# Patient Record
Sex: FEMALE | Race: ASIAN | NOT HISPANIC OR LATINO | Employment: OTHER | URBAN - METROPOLITAN AREA
[De-identification: names, ages, dates, MRNs, and addresses within clinical notes are randomized per-mention and may not be internally consistent; named-entity substitution may affect disease eponyms.]

---

## 2019-04-17 ENCOUNTER — HOSPITAL ENCOUNTER (OUTPATIENT)
Facility: CLINIC | Age: 84
End: 2019-04-17
Attending: INTERNAL MEDICINE | Admitting: INTERNAL MEDICINE
Payer: COMMERCIAL

## 2024-01-01 ENCOUNTER — HOSPITAL ENCOUNTER (OUTPATIENT)
Facility: CLINIC | Age: 89
Setting detail: OBSERVATION
Discharge: HOME | End: 2024-07-22
Attending: EMERGENCY MEDICINE | Admitting: RADIOLOGY
Payer: COMMERCIAL

## 2024-01-01 ENCOUNTER — DOCUMENTATION ONLY (OUTPATIENT)
Dept: OTHER | Facility: CLINIC | Age: 89
End: 2024-01-01
Payer: COMMERCIAL

## 2024-01-01 ENCOUNTER — APPOINTMENT (OUTPATIENT)
Dept: ULTRASOUND IMAGING | Facility: CLINIC | Age: 89
End: 2024-01-01
Attending: INTERNAL MEDICINE
Payer: COMMERCIAL

## 2024-01-01 ENCOUNTER — HOSPITAL ENCOUNTER (OUTPATIENT)
Facility: CLINIC | Age: 89
Setting detail: OBSERVATION
Discharge: HOME | End: 2024-08-05
Attending: EMERGENCY MEDICINE | Admitting: RADIOLOGY
Payer: COMMERCIAL

## 2024-01-01 ENCOUNTER — APPOINTMENT (OUTPATIENT)
Dept: CT IMAGING | Facility: CLINIC | Age: 89
End: 2024-01-01
Attending: EMERGENCY MEDICINE
Payer: COMMERCIAL

## 2024-01-01 ENCOUNTER — DOCUMENTATION ONLY (OUTPATIENT)
Dept: OTHER | Facility: CLINIC | Age: 89
End: 2024-01-01

## 2024-01-01 ENCOUNTER — PATIENT OUTREACH (OUTPATIENT)
Dept: CARE COORDINATION | Facility: CLINIC | Age: 89
End: 2024-01-01
Payer: COMMERCIAL

## 2024-01-01 ENCOUNTER — APPOINTMENT (OUTPATIENT)
Dept: CT IMAGING | Facility: CLINIC | Age: 89
End: 2024-01-01
Payer: COMMERCIAL

## 2024-01-01 VITALS
BODY MASS INDEX: 23.87 KG/M2 | SYSTOLIC BLOOD PRESSURE: 171 MMHG | OXYGEN SATURATION: 94 % | DIASTOLIC BLOOD PRESSURE: 70 MMHG | HEART RATE: 73 BPM | RESPIRATION RATE: 18 BRPM | HEIGHT: 63 IN | TEMPERATURE: 97.7 F | WEIGHT: 134.7 LBS

## 2024-01-01 VITALS
BODY MASS INDEX: 24.25 KG/M2 | HEART RATE: 74 BPM | SYSTOLIC BLOOD PRESSURE: 149 MMHG | DIASTOLIC BLOOD PRESSURE: 52 MMHG | RESPIRATION RATE: 20 BRPM | OXYGEN SATURATION: 92 % | WEIGHT: 136.91 LBS | TEMPERATURE: 97.8 F

## 2024-01-01 DIAGNOSIS — K74.60 CIRRHOSIS OF LIVER WITH ASCITES, UNSPECIFIED HEPATIC CIRRHOSIS TYPE (H): Primary | ICD-10-CM

## 2024-01-01 DIAGNOSIS — I81 PORTAL VEIN THROMBOSIS: ICD-10-CM

## 2024-01-01 DIAGNOSIS — J90 BILATERAL PLEURAL EFFUSION: ICD-10-CM

## 2024-01-01 DIAGNOSIS — R18.8 OTHER ASCITES: Primary | ICD-10-CM

## 2024-01-01 DIAGNOSIS — J96.01 ACUTE RESPIRATORY FAILURE WITH HYPOXIA (H): ICD-10-CM

## 2024-01-01 DIAGNOSIS — C22.0 HCC (HEPATOCELLULAR CARCINOMA) (H): ICD-10-CM

## 2024-01-01 DIAGNOSIS — C78.00 MALIGNANT NEOPLASM METASTATIC TO LUNG, UNSPECIFIED LATERALITY (H): ICD-10-CM

## 2024-01-01 DIAGNOSIS — K74.60 CIRRHOSIS OF LIVER WITH ASCITES, UNSPECIFIED HEPATIC CIRRHOSIS TYPE (H): ICD-10-CM

## 2024-01-01 DIAGNOSIS — R18.8 CIRRHOSIS OF LIVER WITH ASCITES, UNSPECIFIED HEPATIC CIRRHOSIS TYPE (H): ICD-10-CM

## 2024-01-01 DIAGNOSIS — R18.0 MALIGNANT ASCITES (H): ICD-10-CM

## 2024-01-01 DIAGNOSIS — R18.8 CIRRHOSIS OF LIVER WITH ASCITES, UNSPECIFIED HEPATIC CIRRHOSIS TYPE (H): Primary | ICD-10-CM

## 2024-01-01 DIAGNOSIS — S30.1XXA HEMATOMA OF LEFT FLANK, INITIAL ENCOUNTER: ICD-10-CM

## 2024-01-01 DIAGNOSIS — G25.81 RESTLESS LEGS SYNDROME: ICD-10-CM

## 2024-01-01 LAB
% LINING CELLS, BODY FLUID: 7 %
ABSOLUTE NEUTROPHILS, BODY FLUID: 0 /UL
ALBUMIN BODY FLUID SOURCE: NORMAL
ALBUMIN FLD-MCNC: 0.6 G/DL
ALBUMIN SERPL BCG-MCNC: 3.4 G/DL (ref 3.5–5.2)
ALBUMIN SERPL BCG-MCNC: 3.6 G/DL (ref 3.5–5.2)
ALBUMIN SERPL BCG-MCNC: 3.9 G/DL (ref 3.5–5.2)
ALP SERPL-CCNC: 168 U/L (ref 40–150)
ALP SERPL-CCNC: 203 U/L (ref 40–150)
ALT SERPL W P-5'-P-CCNC: 58 U/L (ref 0–50)
ALT SERPL W P-5'-P-CCNC: 82 U/L (ref 0–50)
ANION GAP SERPL CALCULATED.3IONS-SCNC: 13 MMOL/L (ref 7–15)
ANION GAP SERPL CALCULATED.3IONS-SCNC: 14 MMOL/L (ref 7–15)
APPEARANCE FLD: ABNORMAL
APTT PPP: 32 SECONDS (ref 22–38)
AST SERPL W P-5'-P-CCNC: 164 U/L (ref 0–45)
AST SERPL W P-5'-P-CCNC: 264 U/L (ref 0–45)
ATRIAL RATE - MUSE: 67 BPM
BACTERIA FLD CULT: NO GROWTH
BASOPHILS # BLD AUTO: 0 10E3/UL (ref 0–0.2)
BASOPHILS # BLD AUTO: 0 10E3/UL (ref 0–0.2)
BASOPHILS NFR BLD AUTO: 0 %
BASOPHILS NFR BLD AUTO: 0 %
BILIRUB SERPL-MCNC: 1.6 MG/DL
BILIRUB SERPL-MCNC: 3.6 MG/DL
BUN SERPL-MCNC: 11.1 MG/DL (ref 8–23)
BUN SERPL-MCNC: 13.1 MG/DL (ref 8–23)
BUN SERPL-MCNC: 30.2 MG/DL (ref 8–23)
BUN SERPL-MCNC: 32 MG/DL (ref 8–23)
BUN SERPL-MCNC: 32.4 MG/DL (ref 8–23)
CALCIUM SERPL-MCNC: 9 MG/DL (ref 8.8–10.4)
CALCIUM SERPL-MCNC: 9.4 MG/DL (ref 8.8–10.4)
CALCIUM SERPL-MCNC: 9.6 MG/DL (ref 8.8–10.4)
CALCIUM SERPL-MCNC: 9.6 MG/DL (ref 8.8–10.4)
CALCIUM SERPL-MCNC: 9.8 MG/DL (ref 8.8–10.4)
CELL COUNT BODY FLUID SOURCE: ABNORMAL
CHLORIDE SERPL-SCNC: 102 MMOL/L (ref 98–107)
CHLORIDE SERPL-SCNC: 95 MMOL/L (ref 98–107)
CHLORIDE SERPL-SCNC: 95 MMOL/L (ref 98–107)
CHLORIDE SERPL-SCNC: 96 MMOL/L (ref 98–107)
CHLORIDE SERPL-SCNC: 98 MMOL/L (ref 98–107)
COLOR FLD: YELLOW
CREAT SERPL-MCNC: 0.67 MG/DL (ref 0.51–0.95)
CREAT SERPL-MCNC: 0.75 MG/DL (ref 0.51–0.95)
CREAT SERPL-MCNC: 0.85 MG/DL (ref 0.51–0.95)
CREAT SERPL-MCNC: 0.95 MG/DL (ref 0.51–0.95)
CREAT SERPL-MCNC: 1 MG/DL (ref 0.51–0.95)
D DIMER PPP FEU-MCNC: 3.76 UG/ML FEU (ref 0–0.5)
DIASTOLIC BLOOD PRESSURE - MUSE: NORMAL MMHG
EGFRCR SERPLBLD CKD-EPI 2021: 51 ML/MIN/1.73M2
EGFRCR SERPLBLD CKD-EPI 2021: 54 ML/MIN/1.73M2
EGFRCR SERPLBLD CKD-EPI 2021: 62 ML/MIN/1.73M2
EGFRCR SERPLBLD CKD-EPI 2021: 72 ML/MIN/1.73M2
EGFRCR SERPLBLD CKD-EPI 2021: 79 ML/MIN/1.73M2
EOSINOPHIL # BLD AUTO: 0 10E3/UL (ref 0–0.7)
EOSINOPHIL # BLD AUTO: 0.1 10E3/UL (ref 0–0.7)
EOSINOPHIL NFR BLD AUTO: 0 %
EOSINOPHIL NFR BLD AUTO: 1 %
ERYTHROCYTE [DISTWIDTH] IN BLOOD BY AUTOMATED COUNT: 14.2 % (ref 10–15)
ERYTHROCYTE [DISTWIDTH] IN BLOOD BY AUTOMATED COUNT: 15.9 % (ref 10–15)
ERYTHROCYTE [DISTWIDTH] IN BLOOD BY AUTOMATED COUNT: 16.1 % (ref 10–15)
FERRITIN SERPL-MCNC: 522 NG/ML (ref 11–328)
FLUAV RNA SPEC QL NAA+PROBE: NEGATIVE
FLUBV RNA RESP QL NAA+PROBE: NEGATIVE
GLUCOSE SERPL-MCNC: 108 MG/DL (ref 70–99)
GLUCOSE SERPL-MCNC: 109 MG/DL (ref 70–99)
GLUCOSE SERPL-MCNC: 112 MG/DL (ref 70–99)
GLUCOSE SERPL-MCNC: 124 MG/DL (ref 70–99)
GLUCOSE SERPL-MCNC: 96 MG/DL (ref 70–99)
GRAM STAIN RESULT: NORMAL
GRAM STAIN RESULT: NORMAL
HCO3 SERPL-SCNC: 19 MMOL/L (ref 22–29)
HCO3 SERPL-SCNC: 19 MMOL/L (ref 22–29)
HCO3 SERPL-SCNC: 20 MMOL/L (ref 22–29)
HCO3 SERPL-SCNC: 21 MMOL/L (ref 22–29)
HCO3 SERPL-SCNC: 22 MMOL/L (ref 22–29)
HCT VFR BLD AUTO: 34.3 % (ref 35–47)
HCT VFR BLD AUTO: 36.1 % (ref 35–47)
HCT VFR BLD AUTO: 37.1 % (ref 35–47)
HGB BLD-MCNC: 11.1 G/DL (ref 11.7–15.7)
HGB BLD-MCNC: 11.5 G/DL (ref 11.7–15.7)
HGB BLD-MCNC: 11.9 G/DL (ref 11.7–15.7)
IMM GRANULOCYTES # BLD: 0 10E3/UL
IMM GRANULOCYTES # BLD: 0.2 10E3/UL
IMM GRANULOCYTES NFR BLD: 1 %
IMM GRANULOCYTES NFR BLD: 2 %
INR PPP: 1.21 (ref 0.85–1.15)
INTERPRETATION ECG - MUSE: NORMAL
IRON BINDING CAPACITY (ROCHE): 204 UG/DL (ref 240–430)
IRON SATN MFR SERPL: 24 % (ref 15–46)
IRON SERPL-MCNC: 49 UG/DL (ref 37–145)
LD BODY BODY FLUID SOURCE: NORMAL
LDH FLD L TO P-CCNC: 35 U/L
LDH SERPL L TO P-CCNC: 253 U/L (ref 0–250)
LIPASE SERPL-CCNC: 32 U/L (ref 13–60)
LYMPHOCYTES # BLD AUTO: 0.8 10E3/UL (ref 0.8–5.3)
LYMPHOCYTES # BLD AUTO: 1.1 10E3/UL (ref 0.8–5.3)
LYMPHOCYTES NFR BLD AUTO: 18 %
LYMPHOCYTES NFR BLD AUTO: 8 %
LYMPHOCYTES NFR FLD MANUAL: 41 %
MCH RBC QN AUTO: 30 PG (ref 26.5–33)
MCH RBC QN AUTO: 30.3 PG (ref 26.5–33)
MCH RBC QN AUTO: 30.7 PG (ref 26.5–33)
MCHC RBC AUTO-ENTMCNC: 31.9 G/DL (ref 31.5–36.5)
MCHC RBC AUTO-ENTMCNC: 32.1 G/DL (ref 31.5–36.5)
MCHC RBC AUTO-ENTMCNC: 32.4 G/DL (ref 31.5–36.5)
MCV RBC AUTO: 94 FL (ref 78–100)
MCV RBC AUTO: 94 FL (ref 78–100)
MCV RBC AUTO: 95 FL (ref 78–100)
MONOCYTES # BLD AUTO: 0.8 10E3/UL (ref 0–1.3)
MONOCYTES # BLD AUTO: 1.4 10E3/UL (ref 0–1.3)
MONOCYTES NFR BLD AUTO: 14 %
MONOCYTES NFR BLD AUTO: 14 %
MONOS+MACROS NFR FLD MANUAL: 52 %
NEUTROPHILS # BLD AUTO: 4.1 10E3/UL (ref 1.6–8.3)
NEUTROPHILS # BLD AUTO: 7.1 10E3/UL (ref 1.6–8.3)
NEUTROPHILS NFR BLD AUTO: 67 %
NEUTROPHILS NFR BLD AUTO: 75 %
NEUTS BAND NFR FLD MANUAL: 0 %
NRBC # BLD AUTO: 0 10E3/UL
NRBC # BLD AUTO: 0 10E3/UL
NRBC BLD AUTO-RTO: 0 /100
NRBC BLD AUTO-RTO: 0 /100
NT-PROBNP SERPL-MCNC: 309 PG/ML (ref 0–1800)
OSMOLALITY SERPL: 285 MMOL/KG (ref 280–301)
OSMOLALITY UR: 414 MMOL/KG (ref 100–1200)
P AXIS - MUSE: 72 DEGREES
PATH REPORT.COMMENTS IMP SPEC: NORMAL
PATH REPORT.FINAL DX SPEC: NORMAL
PATH REPORT.GROSS SPEC: NORMAL
PATH REPORT.MICROSCOPIC SPEC OTHER STN: NORMAL
PATH REPORT.RELEVANT HX SPEC: NORMAL
PLATELET # BLD AUTO: 183 10E3/UL (ref 150–450)
PLATELET # BLD AUTO: 183 10E3/UL (ref 150–450)
PLATELET # BLD AUTO: 197 10E3/UL (ref 150–450)
POTASSIUM SERPL-SCNC: 3.9 MMOL/L (ref 3.4–5.3)
POTASSIUM SERPL-SCNC: 4 MMOL/L (ref 3.4–5.3)
POTASSIUM SERPL-SCNC: 4.7 MMOL/L (ref 3.4–5.3)
POTASSIUM SERPL-SCNC: 4.7 MMOL/L (ref 3.4–5.3)
POTASSIUM SERPL-SCNC: 4.9 MMOL/L (ref 3.4–5.3)
PR INTERVAL - MUSE: 192 MS
PROT FLD-MCNC: 0.9 G/DL
PROT SERPL-MCNC: 6.5 G/DL (ref 6.4–8.3)
PROT SERPL-MCNC: 7 G/DL (ref 6.4–8.3)
PROT SERPL-MCNC: 7.1 G/DL (ref 6.4–8.3)
PROTEIN BODY FLUID SOURCE: NORMAL
QRS DURATION - MUSE: 82 MS
QT - MUSE: 404 MS
QTC - MUSE: 426 MS
R AXIS - MUSE: -5 DEGREES
RBC # BLD AUTO: 3.61 10E6/UL (ref 3.8–5.2)
RBC # BLD AUTO: 3.83 10E6/UL (ref 3.8–5.2)
RBC # BLD AUTO: 3.93 10E6/UL (ref 3.8–5.2)
RSV RNA SPEC NAA+PROBE: NEGATIVE
SARS-COV-2 RNA RESP QL NAA+PROBE: NEGATIVE
SODIUM SERPL-SCNC: 127 MMOL/L (ref 135–145)
SODIUM SERPL-SCNC: 128 MMOL/L (ref 135–145)
SODIUM SERPL-SCNC: 128 MMOL/L (ref 135–145)
SODIUM SERPL-SCNC: 133 MMOL/L (ref 135–145)
SODIUM SERPL-SCNC: 137 MMOL/L (ref 135–145)
SODIUM UR-SCNC: 20 MMOL/L
SYSTOLIC BLOOD PRESSURE - MUSE: NORMAL MMHG
T AXIS - MUSE: 26 DEGREES
TROPONIN T SERPL HS-MCNC: 17 NG/L
TROPONIN T SERPL HS-MCNC: 20 NG/L
VENTRICULAR RATE- MUSE: 67 BPM
WBC # BLD AUTO: 6.2 10E3/UL (ref 4–11)
WBC # BLD AUTO: 8.5 10E3/UL (ref 4–11)
WBC # BLD AUTO: 9.4 10E3/UL (ref 4–11)
WBC # FLD AUTO: 126 /UL

## 2024-01-01 PROCEDURE — 250N000013 HC RX MED GY IP 250 OP 250 PS 637: Performed by: CLINICAL NURSE SPECIALIST

## 2024-01-01 PROCEDURE — 99223 1ST HOSP IP/OBS HIGH 75: CPT | Performed by: INTERNAL MEDICINE

## 2024-01-01 PROCEDURE — 250N000009 HC RX 250: Performed by: EMERGENCY MEDICINE

## 2024-01-01 PROCEDURE — 85379 FIBRIN DEGRADATION QUANT: CPT

## 2024-01-01 PROCEDURE — 99239 HOSP IP/OBS DSCHRG MGMT >30: CPT | Performed by: INTERNAL MEDICINE

## 2024-01-01 PROCEDURE — 96374 THER/PROPH/DIAG INJ IV PUSH: CPT | Mod: 59

## 2024-01-01 PROCEDURE — 272N000706 US PARACENTESIS WITHOUT ALBUMIN

## 2024-01-01 PROCEDURE — 85014 HEMATOCRIT: CPT | Performed by: INTERNAL MEDICINE

## 2024-01-01 PROCEDURE — 71275 CT ANGIOGRAPHY CHEST: CPT

## 2024-01-01 PROCEDURE — 89050 BODY FLUID CELL COUNT: CPT | Performed by: INTERNAL MEDICINE

## 2024-01-01 PROCEDURE — 96374 THER/PROPH/DIAG INJ IV PUSH: CPT

## 2024-01-01 PROCEDURE — 88342 IMHCHEM/IMCYTCHM 1ST ANTB: CPT | Mod: TC | Performed by: INTERNAL MEDICINE

## 2024-01-01 PROCEDURE — 84157 ASSAY OF PROTEIN OTHER: CPT | Performed by: INTERNAL MEDICINE

## 2024-01-01 PROCEDURE — 83615 LACTATE (LD) (LDH) ENZYME: CPT | Performed by: INTERNAL MEDICINE

## 2024-01-01 PROCEDURE — 80048 BASIC METABOLIC PNL TOTAL CA: CPT | Performed by: STUDENT IN AN ORGANIZED HEALTH CARE EDUCATION/TRAINING PROGRAM

## 2024-01-01 PROCEDURE — 36415 COLL VENOUS BLD VENIPUNCTURE: CPT | Performed by: STUDENT IN AN ORGANIZED HEALTH CARE EDUCATION/TRAINING PROGRAM

## 2024-01-01 PROCEDURE — 250N000013 HC RX MED GY IP 250 OP 250 PS 637: Performed by: INTERNAL MEDICINE

## 2024-01-01 PROCEDURE — 74177 CT ABD & PELVIS W/CONTRAST: CPT

## 2024-01-01 PROCEDURE — G0378 HOSPITAL OBSERVATION PER HR: HCPCS

## 2024-01-01 PROCEDURE — 87205 SMEAR GRAM STAIN: CPT | Performed by: INTERNAL MEDICINE

## 2024-01-01 PROCEDURE — 99285 EMERGENCY DEPT VISIT HI MDM: CPT | Mod: 25

## 2024-01-01 PROCEDURE — 82040 ASSAY OF SERUM ALBUMIN: CPT | Performed by: INTERNAL MEDICINE

## 2024-01-01 PROCEDURE — 250N000011 HC RX IP 250 OP 636

## 2024-01-01 PROCEDURE — 85025 COMPLETE CBC W/AUTO DIFF WBC: CPT

## 2024-01-01 PROCEDURE — 84484 ASSAY OF TROPONIN QUANT: CPT

## 2024-01-01 PROCEDURE — 250N000013 HC RX MED GY IP 250 OP 250 PS 637: Performed by: STUDENT IN AN ORGANIZED HEALTH CARE EDUCATION/TRAINING PROGRAM

## 2024-01-01 PROCEDURE — 36415 COLL VENOUS BLD VENIPUNCTURE: CPT | Performed by: INTERNAL MEDICINE

## 2024-01-01 PROCEDURE — 82728 ASSAY OF FERRITIN: CPT | Performed by: STUDENT IN AN ORGANIZED HEALTH CARE EDUCATION/TRAINING PROGRAM

## 2024-01-01 PROCEDURE — 250N000009 HC RX 250: Performed by: RADIOLOGY

## 2024-01-01 PROCEDURE — 99239 HOSP IP/OBS DSCHRG MGMT >30: CPT | Performed by: STUDENT IN AN ORGANIZED HEALTH CARE EDUCATION/TRAINING PROGRAM

## 2024-01-01 PROCEDURE — 96375 TX/PRO/DX INJ NEW DRUG ADDON: CPT

## 2024-01-01 PROCEDURE — 83930 ASSAY OF BLOOD OSMOLALITY: CPT | Performed by: STUDENT IN AN ORGANIZED HEALTH CARE EDUCATION/TRAINING PROGRAM

## 2024-01-01 PROCEDURE — 80048 BASIC METABOLIC PNL TOTAL CA: CPT | Performed by: INTERNAL MEDICINE

## 2024-01-01 PROCEDURE — 32555 ASPIRATE PLEURA W/ IMAGING: CPT | Mod: 50

## 2024-01-01 PROCEDURE — 84300 ASSAY OF URINE SODIUM: CPT | Performed by: STUDENT IN AN ORGANIZED HEALTH CARE EDUCATION/TRAINING PROGRAM

## 2024-01-01 PROCEDURE — 250N000011 HC RX IP 250 OP 636: Performed by: EMERGENCY MEDICINE

## 2024-01-01 PROCEDURE — 88305 TISSUE EXAM BY PATHOLOGIST: CPT | Mod: TC | Performed by: INTERNAL MEDICINE

## 2024-01-01 PROCEDURE — 83550 IRON BINDING TEST: CPT | Performed by: INTERNAL MEDICINE

## 2024-01-01 PROCEDURE — 88341 IMHCHEM/IMCYTCHM EA ADD ANTB: CPT | Mod: 26 | Performed by: PATHOLOGY

## 2024-01-01 PROCEDURE — 99207 PR APP CREDIT; MD BILLING SHARED VISIT: CPT | Performed by: INTERNAL MEDICINE

## 2024-01-01 PROCEDURE — 80053 COMPREHEN METABOLIC PANEL: CPT | Performed by: EMERGENCY MEDICINE

## 2024-01-01 PROCEDURE — 250N000013 HC RX MED GY IP 250 OP 250 PS 637: Performed by: HOSPITALIST

## 2024-01-01 PROCEDURE — 83690 ASSAY OF LIPASE: CPT

## 2024-01-01 PROCEDURE — 83880 ASSAY OF NATRIURETIC PEPTIDE: CPT

## 2024-01-01 PROCEDURE — 82374 ASSAY BLOOD CARBON DIOXIDE: CPT | Performed by: INTERNAL MEDICINE

## 2024-01-01 PROCEDURE — 88305 TISSUE EXAM BY PATHOLOGIST: CPT | Mod: 26 | Performed by: PATHOLOGY

## 2024-01-01 PROCEDURE — 36415 COLL VENOUS BLD VENIPUNCTURE: CPT

## 2024-01-01 PROCEDURE — 88342 IMHCHEM/IMCYTCHM 1ST ANTB: CPT | Mod: 26 | Performed by: PATHOLOGY

## 2024-01-01 PROCEDURE — 88112 CYTOPATH CELL ENHANCE TECH: CPT | Mod: 26 | Performed by: PATHOLOGY

## 2024-01-01 PROCEDURE — 49083 ABD PARACENTESIS W/IMAGING: CPT

## 2024-01-01 PROCEDURE — 84295 ASSAY OF SERUM SODIUM: CPT

## 2024-01-01 PROCEDURE — 85730 THROMBOPLASTIN TIME PARTIAL: CPT | Performed by: EMERGENCY MEDICINE

## 2024-01-01 PROCEDURE — 85610 PROTHROMBIN TIME: CPT | Performed by: EMERGENCY MEDICINE

## 2024-01-01 PROCEDURE — 83935 ASSAY OF URINE OSMOLALITY: CPT | Performed by: STUDENT IN AN ORGANIZED HEALTH CARE EDUCATION/TRAINING PROGRAM

## 2024-01-01 PROCEDURE — 82042 OTHER SOURCE ALBUMIN QUAN EA: CPT | Performed by: INTERNAL MEDICINE

## 2024-01-01 PROCEDURE — 99207 PR NO BILLABLE SERVICE THIS VISIT: CPT | Performed by: INTERNAL MEDICINE

## 2024-01-01 PROCEDURE — 87637 SARSCOV2&INF A&B&RSV AMP PRB: CPT

## 2024-01-01 PROCEDURE — 85025 COMPLETE CBC W/AUTO DIFF WBC: CPT | Performed by: EMERGENCY MEDICINE

## 2024-01-01 PROCEDURE — 93005 ELECTROCARDIOGRAM TRACING: CPT

## 2024-01-01 PROCEDURE — 99223 1ST HOSP IP/OBS HIGH 75: CPT | Performed by: CLINICAL NURSE SPECIALIST

## 2024-01-01 PROCEDURE — 84155 ASSAY OF PROTEIN SERUM: CPT | Performed by: INTERNAL MEDICINE

## 2024-01-01 PROCEDURE — 250N000009 HC RX 250: Performed by: INTERNAL MEDICINE

## 2024-01-01 PROCEDURE — 36415 COLL VENOUS BLD VENIPUNCTURE: CPT | Performed by: EMERGENCY MEDICINE

## 2024-01-01 RX ORDER — IOPAMIDOL 755 MG/ML
500 INJECTION, SOLUTION INTRAVASCULAR ONCE
Status: COMPLETED | OUTPATIENT
Start: 2024-01-01 | End: 2024-01-01

## 2024-01-01 RX ORDER — LANOLIN ALCOHOL/MO/W.PET/CERES
3 CREAM (GRAM) TOPICAL
Status: DISCONTINUED | OUTPATIENT
Start: 2024-01-01 | End: 2024-01-01 | Stop reason: HOSPADM

## 2024-01-01 RX ORDER — ONDANSETRON 4 MG/1
4 TABLET, ORALLY DISINTEGRATING ORAL EVERY 6 HOURS PRN
Status: DISCONTINUED | OUTPATIENT
Start: 2024-01-01 | End: 2024-01-01 | Stop reason: HOSPADM

## 2024-01-01 RX ORDER — METHOCARBAMOL 500 MG/1
500 TABLET, FILM COATED ORAL 4 TIMES DAILY PRN
Qty: 90 TABLET | Refills: 1 | Status: SHIPPED | OUTPATIENT
Start: 2024-01-01

## 2024-01-01 RX ORDER — ESCITALOPRAM OXALATE 10 MG/1
10 TABLET ORAL DAILY
Status: DISCONTINUED | OUTPATIENT
Start: 2024-01-01 | End: 2024-01-01 | Stop reason: HOSPADM

## 2024-01-01 RX ORDER — LIDOCAINE HYDROCHLORIDE 10 MG/ML
10 INJECTION, SOLUTION EPIDURAL; INFILTRATION; INTRACAUDAL; PERINEURAL ONCE
Status: COMPLETED | OUTPATIENT
Start: 2024-01-01 | End: 2024-01-01

## 2024-01-01 RX ORDER — AMOXICILLIN 250 MG
2 CAPSULE ORAL 2 TIMES DAILY PRN
Status: DISCONTINUED | OUTPATIENT
Start: 2024-01-01 | End: 2024-01-01 | Stop reason: HOSPADM

## 2024-01-01 RX ORDER — POLYETHYLENE GLYCOL 3350 17 G/17G
17 POWDER, FOR SOLUTION ORAL 2 TIMES DAILY PRN
Status: DISCONTINUED | OUTPATIENT
Start: 2024-01-01 | End: 2024-01-01 | Stop reason: HOSPADM

## 2024-01-01 RX ORDER — AMOXICILLIN 250 MG
1 CAPSULE ORAL 2 TIMES DAILY PRN
Status: DISCONTINUED | OUTPATIENT
Start: 2024-01-01 | End: 2024-01-01 | Stop reason: HOSPADM

## 2024-01-01 RX ORDER — ESCITALOPRAM OXALATE 10 MG/1
20 TABLET ORAL DAILY
COMMUNITY

## 2024-01-01 RX ORDER — METHOCARBAMOL 500 MG/1
500 TABLET, FILM COATED ORAL 3 TIMES DAILY PRN
Qty: 20 TABLET | Refills: 0 | Status: SHIPPED | OUTPATIENT
Start: 2024-01-01 | End: 2024-01-01

## 2024-01-01 RX ORDER — ACETAMINOPHEN 650 MG/1
650 SUPPOSITORY RECTAL EVERY 4 HOURS PRN
Status: DISCONTINUED | OUTPATIENT
Start: 2024-01-01 | End: 2024-01-01 | Stop reason: HOSPADM

## 2024-01-01 RX ORDER — ESCITALOPRAM OXALATE 5 MG/1
10 TABLET ORAL DAILY
Status: DISCONTINUED | OUTPATIENT
Start: 2024-01-01 | End: 2024-01-01

## 2024-01-01 RX ORDER — SPIRONOLACTONE 25 MG/1
25 TABLET ORAL DAILY
Status: DISCONTINUED | OUTPATIENT
Start: 2024-01-01 | End: 2024-01-01 | Stop reason: HOSPADM

## 2024-01-01 RX ORDER — AMLODIPINE BESYLATE 10 MG/1
10 TABLET ORAL EVERY EVENING
COMMUNITY

## 2024-01-01 RX ORDER — ONDANSETRON 2 MG/ML
4 INJECTION INTRAMUSCULAR; INTRAVENOUS EVERY 6 HOURS PRN
Status: DISCONTINUED | OUTPATIENT
Start: 2024-01-01 | End: 2024-01-01 | Stop reason: HOSPADM

## 2024-01-01 RX ORDER — ONDANSETRON 2 MG/ML
4 INJECTION INTRAMUSCULAR; INTRAVENOUS EVERY 30 MIN PRN
Status: DISCONTINUED | OUTPATIENT
Start: 2024-01-01 | End: 2024-01-01

## 2024-01-01 RX ORDER — SPIRONOLACTONE 25 MG/1
25 TABLET ORAL DAILY
Qty: 30 TABLET | Refills: 0 | Status: SHIPPED | OUTPATIENT
Start: 2024-01-01 | End: 2024-08-21

## 2024-01-01 RX ORDER — FUROSEMIDE 20 MG
20 TABLET ORAL DAILY
Qty: 30 TABLET | Refills: 0 | Status: SHIPPED | OUTPATIENT
Start: 2024-01-01

## 2024-01-01 RX ORDER — AMLODIPINE BESYLATE 5 MG/1
10 TABLET ORAL EVERY EVENING
Status: DISCONTINUED | OUTPATIENT
Start: 2024-01-01 | End: 2024-01-01 | Stop reason: HOSPADM

## 2024-01-01 RX ORDER — AMLODIPINE BESYLATE 10 MG/1
10 TABLET ORAL EVERY EVENING
Status: DISCONTINUED | OUTPATIENT
Start: 2024-01-01 | End: 2024-01-01 | Stop reason: HOSPADM

## 2024-01-01 RX ORDER — METHOCARBAMOL 500 MG/1
500 TABLET, FILM COATED ORAL 4 TIMES DAILY
Status: DISCONTINUED | OUTPATIENT
Start: 2024-01-01 | End: 2024-01-01 | Stop reason: HOSPADM

## 2024-01-01 RX ORDER — ACETAMINOPHEN 325 MG/1
650 TABLET ORAL EVERY 4 HOURS PRN
Status: DISCONTINUED | OUTPATIENT
Start: 2024-01-01 | End: 2024-01-01 | Stop reason: HOSPADM

## 2024-01-01 RX ORDER — NALOXONE HYDROCHLORIDE 0.4 MG/ML
0.4 INJECTION, SOLUTION INTRAMUSCULAR; INTRAVENOUS; SUBCUTANEOUS
Status: DISCONTINUED | OUTPATIENT
Start: 2024-01-01 | End: 2024-01-01 | Stop reason: HOSPADM

## 2024-01-01 RX ORDER — LIDOCAINE HYDROCHLORIDE 10 MG/ML
22 INJECTION, SOLUTION EPIDURAL; INFILTRATION; INTRACAUDAL; PERINEURAL ONCE
Status: COMPLETED | OUTPATIENT
Start: 2024-01-01 | End: 2024-01-01

## 2024-01-01 RX ORDER — IPRATROPIUM BROMIDE AND ALBUTEROL SULFATE 2.5; .5 MG/3ML; MG/3ML
3 SOLUTION RESPIRATORY (INHALATION) ONCE
Status: COMPLETED | OUTPATIENT
Start: 2024-01-01 | End: 2024-01-01

## 2024-01-01 RX ORDER — ALBUMIN (HUMAN) 12.5 G/50ML
12.5-5 SOLUTION INTRAVENOUS ONCE
Status: DISCONTINUED | OUTPATIENT
Start: 2024-01-01 | End: 2024-01-01 | Stop reason: HOSPADM

## 2024-01-01 RX ORDER — ESCITALOPRAM OXALATE 20 MG/1
20 TABLET ORAL DAILY
Status: DISCONTINUED | OUTPATIENT
Start: 2024-01-01 | End: 2024-01-01 | Stop reason: HOSPADM

## 2024-01-01 RX ORDER — POLYETHYLENE GLYCOL 3350 17 G/17G
17 POWDER, FOR SOLUTION ORAL DAILY
Status: DISCONTINUED | OUTPATIENT
Start: 2024-01-01 | End: 2024-01-01 | Stop reason: HOSPADM

## 2024-01-01 RX ORDER — DIPHENHYDRAMINE HYDROCHLORIDE 50 MG/ML
25 INJECTION INTRAMUSCULAR; INTRAVENOUS ONCE
Status: COMPLETED | OUTPATIENT
Start: 2024-01-01 | End: 2024-01-01

## 2024-01-01 RX ORDER — NALOXONE HYDROCHLORIDE 0.4 MG/ML
0.2 INJECTION, SOLUTION INTRAMUSCULAR; INTRAVENOUS; SUBCUTANEOUS
Status: DISCONTINUED | OUTPATIENT
Start: 2024-01-01 | End: 2024-01-01 | Stop reason: HOSPADM

## 2024-01-01 RX ORDER — MORPHINE SULFATE 2 MG/ML
2 INJECTION, SOLUTION INTRAMUSCULAR; INTRAVENOUS
Status: DISCONTINUED | OUTPATIENT
Start: 2024-01-01 | End: 2024-01-01

## 2024-01-01 RX ORDER — PRAMIPEXOLE DIHYDROCHLORIDE 0.12 MG/1
0.12 TABLET ORAL 3 TIMES DAILY PRN
Status: DISCONTINUED | OUTPATIENT
Start: 2024-01-01 | End: 2024-01-01

## 2024-01-01 RX ORDER — HYDROMORPHONE HYDROCHLORIDE 2 MG/1
2 TABLET ORAL EVERY 4 HOURS PRN
Status: DISCONTINUED | OUTPATIENT
Start: 2024-01-01 | End: 2024-01-01 | Stop reason: HOSPADM

## 2024-01-01 RX ORDER — BISACODYL 10 MG
10 SUPPOSITORY, RECTAL RECTAL DAILY PRN
Status: DISCONTINUED | OUTPATIENT
Start: 2024-01-01 | End: 2024-01-01 | Stop reason: HOSPADM

## 2024-01-01 RX ADMIN — DIPHENHYDRAMINE HYDROCHLORIDE 25 MG: 50 INJECTION INTRAMUSCULAR; INTRAVENOUS at 19:00

## 2024-01-01 RX ADMIN — SODIUM CHLORIDE 57 ML: 9 INJECTION, SOLUTION INTRAVENOUS at 22:26

## 2024-01-01 RX ADMIN — SPIRONOLACTONE 25 MG: 25 TABLET ORAL at 09:42

## 2024-01-01 RX ADMIN — IPRATROPIUM BROMIDE AND ALBUTEROL SULFATE 3 ML: .5; 3 SOLUTION RESPIRATORY (INHALATION) at 23:01

## 2024-01-01 RX ADMIN — LIDOCAINE HYDROCHLORIDE 22 ML: 10 INJECTION, SOLUTION EPIDURAL; INFILTRATION; INTRACAUDAL; PERINEURAL at 10:50

## 2024-01-01 RX ADMIN — IOPAMIDOL 68 ML: 755 INJECTION, SOLUTION INTRAVENOUS at 19:12

## 2024-01-01 RX ADMIN — SODIUM CHLORIDE 78 ML: 9 INJECTION, SOLUTION INTRAVENOUS at 19:12

## 2024-01-01 RX ADMIN — METHOCARBAMOL 500 MG: 500 TABLET ORAL at 18:05

## 2024-01-01 RX ADMIN — ESCITALOPRAM OXALATE 20 MG: 20 TABLET ORAL at 11:12

## 2024-01-01 RX ADMIN — ESCITALOPRAM OXALATE 20 MG: 20 TABLET ORAL at 09:42

## 2024-01-01 RX ADMIN — ESCITALOPRAM OXALATE 10 MG: 10 TABLET ORAL at 09:19

## 2024-01-01 RX ADMIN — SPIRONOLACTONE 25 MG: 25 TABLET ORAL at 11:12

## 2024-01-01 RX ADMIN — AMLODIPINE BESYLATE 10 MG: 5 TABLET ORAL at 20:14

## 2024-01-01 RX ADMIN — LIDOCAINE HYDROCHLORIDE 10 ML: 10 INJECTION, SOLUTION EPIDURAL; INFILTRATION; INTRACAUDAL; PERINEURAL at 13:34

## 2024-01-01 RX ADMIN — MORPHINE SULFATE 2 MG: 2 INJECTION, SOLUTION INTRAMUSCULAR; INTRAVENOUS at 20:30

## 2024-01-01 RX ADMIN — PRAMIPEXOLE DIHYDROCHLORIDE 0.12 MG: 0.12 TABLET ORAL at 12:42

## 2024-01-01 RX ADMIN — AMLODIPINE BESYLATE 10 MG: 5 TABLET ORAL at 02:01

## 2024-01-01 RX ADMIN — IOPAMIDOL 68 ML: 755 INJECTION, SOLUTION INTRAVENOUS at 22:26

## 2024-01-01 RX ADMIN — POLYETHYLENE GLYCOL 3350 17 G: 17 POWDER, FOR SOLUTION ORAL at 09:42

## 2024-01-01 RX ADMIN — AMLODIPINE BESYLATE 10 MG: 10 TABLET ORAL at 00:57

## 2024-01-01 RX ADMIN — BISACODYL 10 MG: 10 SUPPOSITORY RECTAL at 16:57

## 2024-01-01 RX ADMIN — SPIRONOLACTONE 25 MG: 25 TABLET ORAL at 09:19

## 2024-01-01 RX ADMIN — ONDANSETRON 4 MG: 2 INJECTION INTRAMUSCULAR; INTRAVENOUS at 20:27

## 2024-01-01 RX ADMIN — POLYETHYLENE GLYCOL 3350 17 G: 17 POWDER, FOR SOLUTION ORAL at 11:13

## 2024-01-01 ASSESSMENT — ACTIVITIES OF DAILY LIVING (ADL)
ADLS_ACUITY_SCORE: 36
ADLS_ACUITY_SCORE: 33
ADLS_ACUITY_SCORE: 33
ADLS_ACUITY_SCORE: 27
ADLS_ACUITY_SCORE: 27
ADLS_ACUITY_SCORE: 36
ADLS_ACUITY_SCORE: 33
ADLS_ACUITY_SCORE: 33
DEPENDENT_IADLS:: INDEPENDENT
ADLS_ACUITY_SCORE: 27
ADLS_ACUITY_SCORE: 33
ADLS_ACUITY_SCORE: 36
ADLS_ACUITY_SCORE: 36
ADLS_ACUITY_SCORE: 25
ADLS_ACUITY_SCORE: 27
ADLS_ACUITY_SCORE: 33
ADLS_ACUITY_SCORE: 25
ADLS_ACUITY_SCORE: 25
ADLS_ACUITY_SCORE: 36
ADLS_ACUITY_SCORE: 33
ADLS_ACUITY_SCORE: 37
ADLS_ACUITY_SCORE: 36
ADLS_ACUITY_SCORE: 35
ADLS_ACUITY_SCORE: 37
ADLS_ACUITY_SCORE: 34
DEPENDENT_IADLS:: INDEPENDENT
ADLS_ACUITY_SCORE: 27
ADLS_ACUITY_SCORE: 36
ADLS_ACUITY_SCORE: 33
ADLS_ACUITY_SCORE: 27
ADLS_ACUITY_SCORE: 27
ADLS_ACUITY_SCORE: 33
ADLS_ACUITY_SCORE: 27
ADLS_ACUITY_SCORE: 35
ADLS_ACUITY_SCORE: 33
ADLS_ACUITY_SCORE: 33
ADLS_ACUITY_SCORE: 36
ADLS_ACUITY_SCORE: 27
ADLS_ACUITY_SCORE: 36
ADLS_ACUITY_SCORE: 35
ADLS_ACUITY_SCORE: 25
ADLS_ACUITY_SCORE: 34
ADLS_ACUITY_SCORE: 27
ADLS_ACUITY_SCORE: 27
ADLS_ACUITY_SCORE: 33
ADLS_ACUITY_SCORE: 35
ADLS_ACUITY_SCORE: 36
ADLS_ACUITY_SCORE: 35
ADLS_ACUITY_SCORE: 36
ADLS_ACUITY_SCORE: 27
ADLS_ACUITY_SCORE: 35
ADLS_ACUITY_SCORE: 36
ADLS_ACUITY_SCORE: 36
ADLS_ACUITY_SCORE: 35
ADLS_ACUITY_SCORE: 42
ADLS_ACUITY_SCORE: 42
ADLS_ACUITY_SCORE: 33
ADLS_ACUITY_SCORE: 33
ADLS_ACUITY_SCORE: 37
ADLS_ACUITY_SCORE: 33
ADLS_ACUITY_SCORE: 35
ADLS_ACUITY_SCORE: 33

## 2024-01-01 ASSESSMENT — COLUMBIA-SUICIDE SEVERITY RATING SCALE - C-SSRS
6. HAVE YOU EVER DONE ANYTHING, STARTED TO DO ANYTHING, OR PREPARED TO DO ANYTHING TO END YOUR LIFE?: NO
1. IN THE PAST MONTH, HAVE YOU WISHED YOU WERE DEAD OR WISHED YOU COULD GO TO SLEEP AND NOT WAKE UP?: NO
2. HAVE YOU ACTUALLY HAD ANY THOUGHTS OF KILLING YOURSELF IN THE PAST MONTH?: NO

## 2024-07-21 PROBLEM — I81 PORTAL VEIN THROMBOSIS: Status: ACTIVE | Noted: 2024-01-01

## 2024-07-21 PROBLEM — R18.8 OTHER ASCITES: Status: ACTIVE | Noted: 2024-01-01

## 2024-07-21 PROBLEM — K74.60 CIRRHOSIS OF LIVER WITH ASCITES, UNSPECIFIED HEPATIC CIRRHOSIS TYPE (H): Status: ACTIVE | Noted: 2024-01-01

## 2024-07-21 PROBLEM — C22.0 HCC (HEPATOCELLULAR CARCINOMA) (H): Status: ACTIVE | Noted: 2024-01-01

## 2024-07-21 PROBLEM — J90 BILATERAL PLEURAL EFFUSION: Status: ACTIVE | Noted: 2024-01-01

## 2024-07-21 PROBLEM — C78.00 MALIGNANT NEOPLASM METASTATIC TO LUNG, UNSPECIFIED LATERALITY (H): Status: ACTIVE | Noted: 2024-01-01

## 2024-07-21 PROBLEM — R18.8 CIRRHOSIS OF LIVER WITH ASCITES, UNSPECIFIED HEPATIC CIRRHOSIS TYPE (H): Status: ACTIVE | Noted: 2024-01-01

## 2024-07-21 NOTE — ED TRIAGE NOTES
Pt does not speak english, daughter is translating, pt was offered a .  Per daughter pt has had increased abdominal distention over the past week.  She states that she thought it was constipation but she was given miralax and pt is having regular Bms.  Daughter states normally pt passes a lot of gas but she has noticed a marked decline in the amount over the past couple of days.  Daughter also states that she has been more SOB.  Pt does have a hx of liver issues due to hep C     Triage Assessment (Adult)       Row Name 07/21/24 1639          Triage Assessment    Airway WDL WDL        Respiratory WDL    Respiratory WDL WDL        Cardiac WDL    Cardiac WDL WDL

## 2024-07-21 NOTE — ED PROVIDER NOTES
Emergency Department Note      History of Present Illness     Chief Complaint   Abdominal Pain      HPI   Vickie Aquino is a 89 year old female with history of CKD, hepatitis C, hepatocellular carcinoma, and liver cirrhosis who presents with complaint of abdominal bloating and dyspnea on exertion with cough.  Patient is Mandrin speaking, she is here with her daughter who speaks English, she was offered  services however she and the daughter state they prefer for daughter to translate.  Patient lives with her daughter.  Daughter noted her mother was having abdominal discomfort and bloating about 1 week ago.  Her daughter gave her MiraLAX for several days, she had several loose stools however abdominal bloating and discomfort have not resolved.  Abdominal discomfort is in the lower left quadrant, she is unable to describe, not sure if it radiates.  Patient also has complaint of a nonproductive cough and dyspnea on exertion for the last 1 to 2 days.  Patient and daughter went to urgent care today who referred them to emergency department for further workup.  Patient denies fever and chills, headache, dizziness, sore throat, chest pain, nausea and vomiting, diarrhea, dysuria and hematuria.  Patient denies history of ascites.  Patient has history of hepatitis C however she did receive treatment for this 10 years ago.      Independent Historian   Daughter as detailed above.    Review of External Notes   7/21/2024 urgent care visit note reviewed for abdominal bloating and dyspnea on exertion  6/24/2022 CT abdomen pelvis, cirrhosis, no focal liver mass or abnormal enhancement  Past Medical History     Medical History and Problem List   No past medical history on file.    Medications   amLODIPine (NORVASC) 10 MG tablet  escitalopram (LEXAPRO) 10 MG tablet        Surgical History   No past surgical history on file.    Physical Exam     Patient Vitals for the past 24 hrs:   BP Temp Temp src Pulse Resp SpO2 Height  "Weight   07/21/24 1954 (!) 168/80 -- -- 81 -- 96 % -- --   07/21/24 1704 (!) 155/62 -- -- 66 -- -- -- --   07/21/24 1646 (!) 177/86 -- -- 70 -- 95 % -- --   07/21/24 1641 (!) 163/73 97  F (36.1  C) Temporal 85 17 96 % 1.6 m (5' 3\") 61.1 kg (134 lb 11.2 oz)     Physical Exam  Physical Exam:  GENERAL: Warm, dry, alert, mild increased work of breathing  HEENT: PERRLA, clear conjunctiva, oropharynx clear  NECK: No JVD, supple without lymphadenopathy.  No stiffness or restricted range of motion  HEART: Regular rate and rhythm, no murmur or rubs  LUNGS: CTAB, moving air well.  No crackles or wheezes are heard.  ABD: Distended, lower left quadrant tenderness, no rebound, no guarding, with good bowel sounds heard, negative fluid wave.  BACK: No CVAT, no obvious deformities  EXTREMITIES: Bilateral lower extremity +1 pitting edema, equal, no erythema or tenderness.  Moves all extremities without difficulty, no calf tenderness.    SKIN: Warm and dry without rash or lesions.  NEUROLOGICAL: No focal deficits.    PSYCH: Appropriate mood and affect.     Diagnostics     Lab Results   Labs Ordered and Resulted from Time of ED Arrival to Time of ED Departure   D DIMER QUANTITATIVE - Abnormal       Result Value    D-Dimer Quantitative 3.76 (*)    COMPREHENSIVE METABOLIC PANEL - Abnormal    Sodium 133 (*)     Potassium 4.0      Carbon Dioxide (CO2) 21 (*)     Anion Gap 14      Urea Nitrogen 13.1      Creatinine 0.75      GFR Estimate 72      Calcium 9.6      Chloride 98      Glucose 112 (*)     Alkaline Phosphatase 168 (*)      (*)     ALT 58 (*)     Protein Total 7.1      Albumin 3.9      Bilirubin Total 1.6 (*)    TROPONIN T, HIGH SENSITIVITY - Abnormal    Troponin T, High Sensitivity 20 (*)    TROPONIN T, HIGH SENSITIVITY - Abnormal    Troponin T, High Sensitivity 17 (*)    LIPASE - Normal    Lipase 32     NT PROBNP INPATIENT - Normal    N terminal Pro BNP Inpatient 309     INFLUENZA A/B, RSV, & SARS-COV2 PCR - Normal    " Influenza A PCR Negative      Influenza B PCR Negative      RSV PCR Negative      SARS CoV2 PCR Negative     CBC WITH PLATELETS AND DIFFERENTIAL    WBC Count 6.2      RBC Count 3.93      Hemoglobin 11.9      Hematocrit 37.1      MCV 94      MCH 30.3      MCHC 32.1      RDW 14.2      Platelet Count 183      % Neutrophils 67      % Lymphocytes 18      % Monocytes 14      % Eosinophils 1      % Basophils 0      % Immature Granulocytes 1      NRBCs per 100 WBC 0      Absolute Neutrophils 4.1      Absolute Lymphocytes 1.1      Absolute Monocytes 0.8      Absolute Eosinophils 0.1      Absolute Basophils 0.0      Absolute Immature Granulocytes 0.0      Absolute NRBCs 0.0         Imaging   CT Chest (PE) Abdomen Pelvis w Contrast   Final Result   IMPRESSION:   1.  No pulmonary emboli.   2.  Multiple bilateral pulmonary nodules/masses which are most likely metastatic.   3.  Small bilateral pleural effusions, greater on the left.   4.  Mediastinal and right hilar adenopathy which is likely metastatic.   5.  Cirrhosis. Evidence of portal hypertension given by the moderate ascites and lower esophageal varices.   6.  Nonocclusive thrombus within the left portal vein. Occluded right portal vein branches. These demonstrate arterial phase hyperenhancement which is highly suspicious for tumor thrombus. Most likely from HCC.   7.  Ill-defined hepatic mass is not seen however there is heterogeneous enhancement of the posterior segment of the right hepatic lobe which raises the suspicious for an infiltrative HCC.   8.  1.2 cm pancreatic head cyst. 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          EKG   ECG results from 07/21/24   EKG 12-lead, tracing only     Value    Systolic Blood Pressure     Diastolic Blood Pressure     Ventricular Rate 67    Atrial Rate 67    IN Interval 192    QRS Duration 82        QTc 426    P Axis 72    R AXIS -5    T Axis 26    Interpretation ECG      Sinus rhythm with sinus arrhythmia, normal  axis, no ST elevation, T wave inversions noted in V1 and V2  Normal ECG  When compared with ECG of 19-JUL-2011 09:42,  No significant change was found            Independent Interpretation   None    ED Course      Medications Administered   Medications   diphenhydrAMINE (BENADRYL) injection 25 mg (25 mg Intravenous $Given 7/21/24 1900)   iopamidol (ISOVUE-370) solution 500 mL (68 mLs Intravenous $Given 7/21/24 1912)   CT scan flush (78 mLs Intravenous $Given 7/21/24 1912)       Procedures   Procedures     Discussion of Management   Oncology Dr. Gray  Admitting hospitalist Dr. Mayers  Staffed with Dr. Davis  ED Course   ED Course as of 07/21/24 2206   Sun Jul 21, 2024 1725 I evaluated and examined the patient   1805 I spoke with the patient and her daughter regarding the reported iodinated contrast allergy, the daughter states her mother did not have an allergic reaction to contrast, she she was afraid and felt anxiety during an MRI which was reported as an allergy.  I offered to provide a small dose of Benadryl prior to the CT to help with anxiolysis and any possible allergic reaction, the patient and her daughter agreed to this.   2039 I spoke with Dr. Erika Gray from [facility], he recommends no anticoagulation at this time in the setting of hepatocellular carcinoma for the thrombus in the portal vein.   2110 I reevaluated the patient with Dr. Davis at bedside verbally explained all the findings today.  These findings were primarily explained to the patient's daughter and other family members present.  They asked for time to speak with the patient regarding these findings.       Optional/Additional Documentation  None    Medical Decision Making / Diagnosis     CMS Diagnoses: None    MIPS    CT for PE was ordered because the patient had an abnormal d-dimer.    JHOANA Aquino is a 89 year old female with history of CKD, hepatitis C, hepatocellular carcinoma, and liver cirrhosis who presents  with complaint of abdominal discomfort/bloating and dyspnea on exertion with cough.  Differential includes but is not limited to SBO, ascites, spontaneous bacterial peritonitis, pneumonia, pulmonary embolism, congestive heart failure, and ACS among many others.  Vital signs at presentation, mild hypertension blood pressure 163/73 otherwise she was not tachycardic, not hypoxic, and she was afebrile.  Physical exam was significant for a mildly distended and tender abdomen and bilaterally decreased lung sounds without wheezing or rhonchi.    On workup today, initial troponin was 20, delta troponin downtrending at 17.  ECG was fairly reassuring without patterns consistent with ACS.  Her BNP was 309, I doubt heart failure as cause for her lower extremity edema and BOWDEN.  Viral swab was negative for influenza, COVID-19, and RSV.  She did have mild hyponatremia, most likely dilutional, otherwise no other electrolyte derangements.   Her liver enzymes were elevated, she does have a history of HCC and liver cirrhosis.  D-dimer was obtained due to history of cancer with shortness of breath, elevated at 3.76, a CT for PE was obtained as well as CT of abdomen/pelvis given abdominal discomfort.  There was no pulmonary embolism seen however there were multiple bilateral pulmonary nodules and masses consistent with metastatic disease.  Other findings include small bilateral pleural effusions, moderate ascites, esophageal varices, left portal vein nonocclusive thrombus, right portal vein branch occluded all most likely related to HCC.  Dr. Christianson and I spoke to the patient's daughter regarding these findings, there was no known history of metastatic disease.  Although the nodule has been found in her liver previously that was attributed to HCC, the decision was made 2 years ago not to pursue aggressive treatment due to concern for complications given her age.  Due to newly found metastatic disease, we recommended admission for  discussion of ongoing care, they agree.  I also spoke with [facility] regarding the portal vein thrombus, they recommend no anticoagulation at this time.  I spoke with hospital medicine, Otto Mayers MD, patient accepted for admission for HCC, metastatic disease, ascites, pleural effusions, and portal vein thrombus.  The patient was subsequently admitted in stable condition.      Disposition   The patient was admitted to the hospital.     Diagnosis     ICD-10-CM    1. HCC (hepatocellular carcinoma) (H)  C22.0       2. Malignant neoplasm metastatic to lung, unspecified laterality (H)  C78.00       3. Cirrhosis of liver with ascites, unspecified hepatic cirrhosis type (H)  K74.60     R18.8       4. Bilateral pleural effusion  J90       5. Portal vein thrombosis  I81            Discharge Medications   New Prescriptions    No medications on file         MAVIS Pagan John, PA-C  07/21/24 7182

## 2024-07-21 NOTE — ED PROVIDER NOTES
ED APC SUPERVISION NOTE:   I evaluated this patient in conjunction with Seamus Patrick PA-C  I have participated in the care of the patient and personally performed key elements of the history, exam, and medical decision making.      HPI:   Vickie Aquino is a 89 year old female with a history of CKD, hypertension, hepatitis C, hepatocellular carcinoma, and liver cirrhosis who presents to the ED with abdominal pain. Patients daughter reports that she has been having constant abdominal discomfort and distension for the last week and a half. Daughter gave her Miralax thinking it was constipation so she has been having normal bowel movements although has not been passing as much gas as normal. For the last 2 days she has been having dyspnea on exertion and today began having a cough. History of Hepatitis C which was treated over 10 years ago. Daughter also notes she had a finding of a small nodule in liver that they decided to leave unremoved. No chest pain or fevers. No cardiac history or blood thinners use.     Patient is Mandarin speaking and is here with her daughter who translates and reports as above.     Independent Historian:   Daughter as detailed above.  Patient declining professional Mandarin     Review of External Notes: None      EXAM:   General:   Well-nourished   Speaking in full sentences  Eyes:   Conjunctiva without injection or scleral icterus  ENT:   Moist mucous membranes   Nares patent   Pinnae normal  Neck:   Full ROM   No stiffness appreciated  Resp:   Lungs CTAB   No crackles, wheezing or audible rubs   Good air movement  CV:    Normal rate, regular rhythm   S1 and S2 present   No murmur, gallop or rub  GI:   BS present   Abdomen soft without distention   Protuberant   No fluid wave   Mild TTP to LLQ   No guarding or rebound tenderness  Skin:   Warm, dry, well perfused   No rashes or open wounds on exposed skin  MSK:   Moves all extremities   No focal deformities or  swelling  Neuro:   Alert   Answers questions appropriately   Moves all extremities equally   Gait stable  Psych:   Normal affect, normal mood    Independent Interpretation (X-rays, CTs, rhythm strip):  None    Consultations/Discussion of Management or Tests:  None     Social Determinants of Health affecting care:   None     MEDICAL DECISION MAKING/ASSESSMENT AND PLAN:   Vickie qAuino is a 89-year-old female presenting to the ED for evaluation of abdominal pain and shortness of breath.  VS on presentation reveal elevated BP though otherwise are unremarkable.  History and evaluation as noted above.  Unfortunately, patient's imaging findings are concerning for newly diagnosed malignancy, and likely with metastatic disease to the lungs and abdomen.  Findings of portal vein thrombosis are noted, and care was discussed with hematology/oncology, who at this point felt that anticoagulation can be deferred.  Results and impression discussed with patient, and multiple family members present at bedside.  Will plan admission to the medicine service for further management, and consultation with oncology for next steps.  Family in agreement with outlined plan of care.     DIAGNOSIS:     ICD-10-CM    1. HCC (hepatocellular carcinoma) (H)  C22.0       2. Malignant neoplasm metastatic to lung, unspecified laterality (H)  C78.00       3. Cirrhosis of liver with ascites, unspecified hepatic cirrhosis type (H)  K74.60     R18.8       4. Bilateral pleural effusion  J90       5. Portal vein thrombosis  I81           DISPOSITION:   Patient was admitted to the hospital.      Scribe Disclosure:  Chadwick LORD, am serving as a scribe at 5:40 PM on 7/21/2024 to document services personally performed by Elio Davis MD based on my observations and the provider's statements to me.     7/21/2024  [facility] EMERGENCY DEPT     Elio Davis MD  07/21/24 0390

## 2024-07-22 NOTE — PROGRESS NOTES
Update    Had a long 45 min conversation with family.     Patient now DNR/I, goal home with hospice. Getting paracentesis for comfort  POLST form filled out by me    No need for palliative nor oncology at this point    Discharge summary to follow    Yony Mena MD

## 2024-07-22 NOTE — PHARMACY-ADMISSION MEDICATION HISTORY
Pharmacist Admission Medication History    Admission medication history is complete. The information provided in this note is only as accurate as the sources available at the time of the update.    Information Source(s): Family member and CareEverywhere/SureScripts via in-person    Pertinent Information: I spoke with Vickie's family who confirmed with her she takes just 1 tablet of escitalopram daily despite recent dispense records suggesting one and one-half tablet daily (135 tabs for 90 days supply).    Changes made to PTA medication list:  Added: All  Deleted: None  Changed: None    Allergies reviewed with patient and updates made in EHR: yes    Medication History Completed By: James Quintero [facility] 7/21/2024 8:54 PM    PTA Med List   Medication Sig Last Dose    amLODIPine (NORVASC) 10 MG tablet Take 10 mg by mouth daily 7/20/2024 at 2100    escitalopram (LEXAPRO) 10 MG tablet Take 10 mg by mouth daily 7/21/2024 at 0900

## 2024-07-22 NOTE — H&P
[facility]    History and Physical - Hospitalist Service       Date of Admission:  7/21/2024    Assessment & Plan      Vickie Aquino is a 89 year old female admitted on 7/21/2024 with bloating and dyspnea on exertion likely due to progressive and metastatic hepatocellular carcinoma. She has history of hypertension, chronic kidney disease, depression, and suspected hepatocellular carcinoma with cirrhosis.  She has followed with [facility] [place].  About 2 years ago she had a liver lesion that was thought to be hepatocellular carcinoma.  Patient and family decided not to evaluate this further as it was expected to be slow-growing and her age was quite advanced.  She never had a biopsy or other evaluation.  She has done quite well.  She lives with her daughter.  She is still active and gardens.  She presented to the emergency department today with several days of progressive abdominal bloating.  She has had some cough and some dyspnea on exertion.  She has had a little bit of abdominal pain but not much.  It is mostly associated with the distention.  No chest pain.  No fevers or chills.  No diarrhea or dysuria.  Only slight edema.    Emergency department evaluation showed elevated blood pressure but overall stable vital signs.  Laboratory evaluation showed modest LFT abnormalities with alkaline phosphatase 168, ALT 58, , and total bilirubin 1.6.  Troponins were slightly elevated at 20 and 17 on repeat.  Sodium was little bit low at 133 and bicarb was 21.  CBC was unremarkable.  D-dimer was 3.76.  Testing for COVID/influenza/RSV was negative.  CT of chest/abdomen/pelvis with PE protocol was done and showed no PE.  It showed multiple bilateral pulmonary nodules/masses most likely metastatic disease.  Small bilateral pleural effusions were noted, left greater than right.  Mediastinal and right hilar adenopathy was noted, likely metastatic.  There was cirrhosis with evidence of portal hypertension  with moderate ascites and lower esophageal varices.  There was nonocclusive thrombus in the left portal vein and occluded right portal vein branches; these demonstrated arterial phase hyperenhancement which was highly suspicious for tumor thrombus, most likely from hepatocellular carcinoma.  There was ill-defined hepatic mass suspicious for an infiltrative hepatocellular carcinoma.  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  I was asked to admit the patient for further cares.  In talking with the patient's daughter and granddaughter they corroborate that the patient was given a diagnosis of probable hepatocellular carcinoma 2 years ago.  They were told it was slow-growing.  Aggressive treatment was not recommended or wanted.  Family is aware that patient's disease has obviously progressed.  They are not interested in aggressive cares.  They are interested in palliative measures such as paracentesis and initiation of diuretic therapy.  I offered palliative care consultation to clarify goals of care and possibly pursue hospice care at home and they were interested in that to.  I do think the patient will do well for a while.  Given her quite advanced age, however, a 6-month prognosis seems likely.  I do think she would qualify for hospice.    Problem list:    Progressive metastatic disease, likely hepatocellular carcinoma  Cirrhosis, likely due to hepatocellular carcinoma  Moderate ascites with abdominal distention; unsure if malignant or related to cirrhosis  Lower esophageal varices  Pulmonary metastases  Bilateral pleural effusions; unsure if malignant or related to cirrhosis  Suspected lymphatic metastases  Left portal vein thrombus and right portal vein occlusion; likely due to tumor thrombus  -Admit to observation, as patient's daughter hopes to be able to take her home tomorrow  -Paracentesis tomorrow.  I have ordered labs including cell count, culture, albumin, protein, LDH, and  cytology.  I have ordered serum protein, albumin, and LDH.  -Start Aldactone 25 mg by mouth daily.  Consider adding low-dose Lasix  -Palliative care consult regarding goals of care, POLST, and possible hospice consult Feng  -[place] [facility] was contacted by phone.  I will ask them to see her as patient's family has some questions about prognosis.  -I would not pursue thoracentesis at this time as they are small,    Hypertension  -Resume prior to admission amlodipine    Kidney disease  -Stable    Depression  -Resume prior to admission Lexapro     Observation Goals: -diagnostic tests and consults completed and resulted, -vital signs normal or at patient baseline, Nurse to notify provider when observation goals have been met and patient is ready for discharge.  Diet: Regular Diet Adult    DVT Prophylaxis: Ambulate every shift  Mena Catheter: Not present  Lines: None     Cardiac Monitoring: None  Code Status: Full Code      Clinically Significant Risk Factors Present on Admission                                         Disposition Plan     Medically Ready for Discharge: Anticipated Tomorrow           James Mayers MD  Hospitalist Service  [facility]  Securely message with [email or web address] (more info)  Text page via [facility] Paging/Directory     ______________________________________________________________________    Chief Complaint   Abdominal bloating and dyspnea on exertion    History is obtained from the patient, her daughter, ED provider, and the medical record    History of Present Illness   Vickie Aquino is a 89 year old female admitted on 7/21/2024 with bloating and dyspnea on exertion likely due to progressive and metastatic hepatocellular carcinoma. She has history of hypertension, chronic kidney disease, depression, and suspected hepatocellular carcinoma with cirrhosis.  She has followed with [facility].  About 2 years ago she had a liver lesion that was thought to be hepatocellular  carcinoma.  Patient and family decided not to evaluate this further as it was expected to be slow-growing and her age was quite advanced.  She never had a biopsy or other evaluation.  She has done quite well.  She lives with her daughter.  She is still active and gardens.  She presented to the emergency department today with several days of progressive abdominal bloating.  She has had some cough and some dyspnea on exertion.  She has had a little bit of abdominal pain but not much.  It is mostly associated with the distention.  No chest pain.  No fevers or chills.  No diarrhea or dysuria.  Only slight edema.    Emergency department evaluation showed elevated blood pressure but overall stable vital signs.  Laboratory evaluation showed modest LFT abnormalities with alkaline phosphatase 168, ALT 58, , and total bilirubin 1.6.  Troponins were slightly elevated at 20 and 17 on repeat.  Sodium was little bit low at 133 and bicarb was 21.  CBC was unremarkable.  D-dimer was 3.76.  Testing for COVID/influenza/RSV was negative.  CT of chest/abdomen/pelvis with PE protocol was done and showed no PE.  It showed multiple bilateral pulmonary nodules/masses most likely metastatic disease.  Small bilateral pleural effusions were noted, left greater than right.  Mediastinal and right hilar adenopathy was noted, likely metastatic.  There was cirrhosis with evidence of portal hypertension with moderate ascites and lower esophageal varices.  There was nonocclusive thrombus in the left portal vein and occluded right portal vein branches; these demonstrated arterial phase hyperenhancement which was highly suspicious for tumor thrombus, most likely from hepatocellular carcinoma.  There was ill-defined hepatic mass suspicious for an infiltrative hepatocellular carcinoma.  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  I was asked to admit the patient for further cares.  In talking with the patient's daughter  and granddaughter they corroborate that the patient was given a diagnosis of probable hepatocellular carcinoma 2 years ago.  They were told it was slow-growing.  Aggressive treatment was not recommended or wanted.  Family is aware that patient's disease has obviously progressed.  They are not interested in aggressive cares.  They are interested in palliative measures such as paracentesis and initiation of diuretic therapy.  I offered palliative care consultation to clarify goals of care and possibly pursue hospice care at home and they were interested in that to.  I do think the patient will do well for a while.  Given her quite advanced age, however, a 6-month prognosis seems likely.  I do think she would qualify for hospice.      Past Medical History    Hypertension, chronic kidney disease, depression, and suspected hepatocellular carcinoma with cirrhosis      Prior to Admission Medications   Prior to Admission Medications   Prescriptions Last Dose Informant Patient Reported? Taking?   amLODIPine (NORVASC) 10 MG tablet 7/20/2024 at 2100 Daughter Yes Yes   Sig: Take 10 mg by mouth daily   escitalopram (LEXAPRO) 10 MG tablet 7/21/2024 at 0900 Daughter Yes Yes   Sig: Take 10 mg by mouth daily      Facility-Administered Medications: None        Review of Systems    The 10 point Review of Systems is negative other than noted in the HPI or here.     Social History   Lives with daughter  Does not smoke or use alcohol         Family History     Noncontributory      Allergies   Allergies   Allergen Reactions    Iodinated Contrast Media Other (See Comments)        Physical Exam   Vital Signs: Temp: 97.9  F (36.6  C) Temp src: Oral BP: (!) 155/62 Pulse: 76   Resp: 18 SpO2: 93 % O2 Device: None (Room air)    Weight: 134 lbs 11.22 oz    GENERAL: Pleasant and cooperative. No acute distress.  EYES: Pupils equal and round. No scleral erythema or icterus.  ENT: External ears are normal without deformity. Posterior oropharynx is  without erythem, swelling, or exudate.  NECK: Supple. No masses or swelling. No tenderness. Thyroid is normal without mass or tenderness.  CHEST: Clear to auscultation. Normal breath sounds. No retractions.   CV: Regular rate and rhythm. No JVD. Pulses normal.  ABDOMEN: Bowel sounds present.  Moderate distention with mild right-sided  tenderness. No masses or hernia.  EXTREMETIES: No clubbing, cyanosis, or ischemia.  SKIN: Warm and dry to touch. No wounds or rashes.  NEUROLOGIC: Strength and sensation are normal. Deep tendon reflexes are normal. Cranial nerves are normal.      Medical Decision Making       75 MINUTES SPENT BY ME on the date of service doing chart review, history, exam, documentation & further activities per the note.      Data     I have personally reviewed the following data over the past 24 hrs:    6.2  \   11.9   / 183     133 (L) 98 13.1 /  112 (H)   4.0 21 (L) 0.75 \     ALT: 58 (H) AST: 164 (H) AP: 168 (H) TBILI: 1.6 (H)   ALB: 3.9 TOT PROTEIN: 7.1 LIPASE: 32     Trop: 17 (H) BNP: 309     INR:  N/A PTT:  N/A   D-dimer:  3.76 (H) Fibrinogen:  N/A       Imaging results reviewed over the past 24 hrs:   Recent Results (from the past 24 hour(s))   CT Chest (PE) Abdomen Pelvis w Contrast    Narrative    EXAM: CT CHEST PE ABDOMEN PELVIS W CONTRAST  LOCATION: [facility]  DATE: 7/21/2024    INDICATION: Dyspnea on expiration. History of hepatocellular carcinoma. Elevated d-dimer. Left lower quadrant abdominal pain. Abdominal bloating.  COMPARISON: Right upper quadrant ultrasound 4/16/2021  TECHNIQUE: CT chest pulmonary angiogram and routine CT abdomen pelvis with IV contrast. Arterial phase through the chest and venous phase through the abdomen and pelvis. Multiplanar reformats and MIP reconstructions were performed. Dose reduction   techniques were used.   CONTRAST: 68mL Isovue 370    FINDINGS:  ANGIOGRAM CHEST: Pulmonary arteries are normal caliber and negative for pulmonary  emboli. Normal caliber thoracic aorta with mild calcified atherosclerotic changes. No dissection.      LUNGS AND PLEURA: Multiple soft tissue nodules/masses within the bilateral lungs. The dominant mass within the right lower lobe measures 3.4 x 2.8 cm. Small to moderate-sized left pleural effusion. Small right pleural effusion. Mild, associated left   lower lobe atelectasis. Mild geographic groundglass changes within the right lower lobe with smooth interlobular septal thickening.    MEDIASTINUM/AXILLAE: Mediastinal and right hilar adenopathy. A representative subcarinal lymph node measures 3.1 x 1.6 cm. Left atrial dilatation. Lower esophageal varices.    CORONARY ARTERY CALCIFICATION: Cannot evaluate.    HEPATOBILIARY: Cirrhosis. Heterogeneous enhancement of the posterior segment of the right hepatic lobe without a defined mass. Mild intra and extrahepatic biliary dilatation. None dilated gallbladder.    PANCREAS: 1.2 cm cystic-appearing lesion within the pancreatic head (image 79 series 6).    SPLEEN: Splenic size is within normal limits.    ADRENAL GLANDS: Normal.    KIDNEYS/BLADDER: Mildly atrophic left kidney with a lobulated contour likely due to scarring. No hydronephrosis.    BOWEL: No obstruction or inflammatory change. Moderate ascites. Mesenteric edematous changes.    LYMPH NODES: Normal.    VASCULATURE: Moderate calcified atherosclerotic changes. No abdominal aortic aneurysm. Rounded, nonocclusive thrombus within the left portal vein (images 56-60 series 6). Occluded anterior and posterior right portal vein branches. The occluded segments   demonstrate arterial phase hyperenhancement. Patent main portal vein. Patent splenic and superior mesenteric veins. Gastrohepatic ligament varicosities communicating with the lower esophageal varices.    PELVIC ORGANS: Normal.    MUSCULOSKELETAL: Heterogeneous osteopenia. Mild compression of the superior endplate of T7. No suspicious osseous lesions.      Impression     IMPRESSION:  1.  No pulmonary emboli.  2.  Multiple bilateral pulmonary nodules/masses which are most likely metastatic.  3.  Small bilateral pleural effusions, greater on the left.  4.  Mediastinal and right hilar adenopathy which is likely metastatic.  5.  Cirrhosis. Evidence of portal hypertension given by the moderate ascites and lower esophageal varices.  6.  Nonocclusive thrombus within the left portal vein. Occluded right portal vein branches. These demonstrate arterial phase hyperenhancement which is highly suspicious for tumor thrombus. Most likely from HCC.  7.  Ill-defined hepatic mass is not seen however there is heterogeneous enhancement of the posterior segment of the right hepatic lobe which raises the suspicious for an infiltrative HCC.  8.  1.2 cm pancreatic head cyst. 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     Recent Labs   Lab 07/21/24  1733   WBC 6.2   HGB 11.9   MCV 94      *   POTASSIUM 4.0   CHLORIDE 98   CO2 21*   BUN 13.1   CR 0.75   ANIONGAP 14   VIMAL 9.6   *   ALBUMIN 3.9   PROTTOTAL 7.1   BILITOTAL 1.6*   ALKPHOS 168*   ALT 58*   *   LIPASE 32

## 2024-07-22 NOTE — DISCHARGE SUMMARY
[facility]  Hospitalist Discharge Summary      Date of Admission:  7/21/2024  Date of Discharge:  7/22/2024  Discharging Provider: Yony Mena MD  Discharge Service: Hospitalist Service  Primary Care Physician   Caroline Fernandez    Discharge Diagnoses   Metastatic carcinoma presumed hepatocellular as a primary  Cirrhosis  Moderate ascites with abdominal distention, presumed malignant  Lower esophageal varices  Left portal vein thrombosis with occlusion of the right portal vein  Hypertension  Chronic kidney disease  Depression  Advance care planning    Hospital Course   Vickie Aquino is a 89 year old female admitted on 7/21/2024 with bloating and dyspnea on exertion likely due to progressive and metastatic hepatocellular carcinoma. She has history of hypertension, chronic kidney disease, depression, and suspected hepatocellular carcinoma with cirrhosis.  She has followed with [facility].  About 2 years ago she had a liver lesion that was thought to be hepatocellular carcinoma.  Patient and family decided not to evaluate this further as it was expected to be slow-growing and her age was quite advanced.  She never had a biopsy or other evaluation.  She has done quite well.  She lives with her daughter.  She is still active and gardens.  She presented to the emergency department today with several days of progressive abdominal bloating.  She has had some cough and some dyspnea on exertion.  She has had a little bit of abdominal pain but not much.  It is mostly associated with the distention.  No chest pain.  No fevers or chills.  No diarrhea or dysuria.  Only slight edema.     Emergency department evaluation showed elevated blood pressure but overall stable vital signs.  Laboratory evaluation showed modest LFT abnormalities with alkaline phosphatase 168, ALT 58, , and total bilirubin 1.6.  Troponins were slightly elevated at 20 and 17 on repeat.  Sodium was little bit low at 133 and  bicarb was 21.  CBC was unremarkable.  D-dimer was 3.76.  Testing for COVID/influenza/RSV was negative.  CT of chest/abdomen/pelvis with PE protocol was done and showed no PE.  It showed multiple bilateral pulmonary nodules/masses most likely metastatic disease.  Small bilateral pleural effusions were noted, left greater than right.  Mediastinal and right hilar adenopathy was noted, likely metastatic.  There was cirrhosis with evidence of portal hypertension with moderate ascites and lower esophageal varices.  There was nonocclusive thrombus in the left portal vein and occluded right portal vein branches; these demonstrated arterial phase hyperenhancement which was highly suspicious for tumor thrombus, most likely from hepatocellular carcinoma.  There was ill-defined hepatic mass suspicious for an infiltrative hepatocellular carcinoma.  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  I was asked to admit the patient for further cares.  In talking with the patient's daughter and granddaughter they corroborate that the patient was given a diagnosis of probable hepatocellular carcinoma 2 years ago.  They were told it was slow-growing.  Aggressive treatment was not recommended or wanted.  Family is aware that patient's disease has obviously progressed.  They are not interested in aggressive cares.  They are interested in palliative measures such as paracentesis and initiation of diuretic therapy.  I offered palliative care consultation to clarify goals of care and possibly pursue hospice care at home and they were interested in that to.  I do think the patient will do well for a while.  Given her quite advanced age, however, a 6-month prognosis seems likely.  I do think she would qualify for hospice.     Problem list:     Progressive metastatic disease, likely hepatocellular carcinoma  Cirrhosis, likely due to hepatocellular carcinoma  Moderate ascites with abdominal distention; unsure if malignant  or related to cirrhosis  Lower esophageal varices  Pulmonary metastases  Bilateral pleural effusions; unsure if malignant or related to cirrhosis  Suspected lymphatic metastases  Left portal vein thrombus and right portal vein occlusion; likely due to tumor thrombus  Patient came to the hospital as her abdominal cavity was getting more distended and she was having more discomfort.  She was admitted to observation and the plan is to do a paracentesis to help with her comfort.  She is also been started on Aldactone to help with her ascites and could have Lasix added as well if necessary.  This the first time the patient had a paracentesis so we will hold off on thinking about a more permanent catheter.     Addendum:  900 ml were removed of yellow fluid      Advance care planning   I had a 60-minute bedside conversation with the patient, her daughter, and multiple family members on the phone including her granddaughters and son.  We had a long discussion about goals of care.  Patient is currently living with her daughter and her quality life is to live each day the way she wants to.  In the past and found a liver mass and decided not to work it up which was appropriate.  At this point we will make sure the patient continues to be comfortable and to be able to stay at home.  She is elderly and frail and we do not want to have to have recurrent hospital stays or ER visits.  We discussed at length about heroics and she is DNR/I.  We discussed how to make her comfortable in the future if she should have symptoms and that would not be aided by hospice at home which will be set up by social work.  At this point the patient does not need comfort medications that she is pretty comfortable and but will need them in the future.  I am not sure how much time patient has left but she is already having significant side effects from her metastatic cancer.  I did fill out a POLST form with the patient's daughter and she will take this  home.  We can be focusing on her comfort.  She is okay with antibiotics orally at this point.  Would not do any tube feeds.     Hypertension   Resume prior to admission amlodipine, as she gets more frail this can be discontinued     Kidney disease        Depression  Resume prior to admission Lexapro       Clinically Significant Risk Factors          Significant Results and Procedures   Most Recent 3 CBC's:  Recent Labs   Lab Test 07/21/24  1733   WBC 6.2   HGB 11.9   MCV 94        Most Recent 3 BMP's:  Recent Labs   Lab Test 07/22/24  0736 07/21/24  1733    133*   POTASSIUM 3.9 4.0   CHLORIDE 102 98   CO2 22 21*   BUN 11.1 13.1   CR 0.67 0.75   ANIONGAP 13 14   VIMAL 9.6 9.6   * 112*   ,   Results for orders placed or performed during the hospital encounter of 07/21/24   CT Chest (PE) Abdomen Pelvis w Contrast    Narrative    EXAM: CT CHEST PE ABDOMEN PELVIS W CONTRAST  LOCATION: [facility]  DATE: 7/21/2024    INDICATION: Dyspnea on expiration. History of hepatocellular carcinoma. Elevated d-dimer. Left lower quadrant abdominal pain. Abdominal bloating.  COMPARISON: Right upper quadrant ultrasound 4/16/2021  TECHNIQUE: CT chest pulmonary angiogram and routine CT abdomen pelvis with IV contrast. Arterial phase through the chest and venous phase through the abdomen and pelvis. Multiplanar reformats and MIP reconstructions were performed. Dose reduction   techniques were used.   CONTRAST: 68mL Isovue 370    FINDINGS:  ANGIOGRAM CHEST: Pulmonary arteries are normal caliber and negative for pulmonary emboli. Normal caliber thoracic aorta with mild calcified atherosclerotic changes. No dissection.      LUNGS AND PLEURA: Multiple soft tissue nodules/masses within the bilateral lungs. The dominant mass within the right lower lobe measures 3.4 x 2.8 cm. Small to moderate-sized left pleural effusion. Small right pleural effusion. Mild, associated left   lower lobe atelectasis. Mild  geographic groundglass changes within the right lower lobe with smooth interlobular septal thickening.    MEDIASTINUM/AXILLAE: Mediastinal and right hilar adenopathy. A representative subcarinal lymph node measures 3.1 x 1.6 cm. Left atrial dilatation. Lower esophageal varices.    CORONARY ARTERY CALCIFICATION: Cannot evaluate.    HEPATOBILIARY: Cirrhosis. Heterogeneous enhancement of the posterior segment of the right hepatic lobe without a defined mass. Mild intra and extrahepatic biliary dilatation. None dilated gallbladder.    PANCREAS: 1.2 cm cystic-appearing lesion within the pancreatic head (image 79 series 6).    SPLEEN: Splenic size is within normal limits.    ADRENAL GLANDS: Normal.    KIDNEYS/BLADDER: Mildly atrophic left kidney with a lobulated contour likely due to scarring. No hydronephrosis.    BOWEL: No obstruction or inflammatory change. Moderate ascites. Mesenteric edematous changes.    LYMPH NODES: Normal.    VASCULATURE: Moderate calcified atherosclerotic changes. No abdominal aortic aneurysm. Rounded, nonocclusive thrombus within the left portal vein (images 56-60 series 6). Occluded anterior and posterior right portal vein branches. The occluded segments   demonstrate arterial phase hyperenhancement. Patent main portal vein. Patent splenic and superior mesenteric veins. Gastrohepatic ligament varicosities communicating with the lower esophageal varices.    PELVIC ORGANS: Normal.    MUSCULOSKELETAL: Heterogeneous osteopenia. Mild compression of the superior endplate of T7. No suspicious osseous lesions.      Impression    IMPRESSION:  1.  No pulmonary emboli.  2.  Multiple bilateral pulmonary nodules/masses which are most likely metastatic.  3.  Small bilateral pleural effusions, greater on the left.  4.  Mediastinal and right hilar adenopathy which is likely metastatic.  5.  Cirrhosis. Evidence of portal hypertension given by the moderate ascites and lower esophageal varices.  6.  Nonocclusive  thrombus within the left portal vein. Occluded right portal vein branches. These demonstrate arterial phase hyperenhancement which is highly suspicious for tumor thrombus. Most likely from HCC.  7.  Ill-defined hepatic mass is not seen however there is heterogeneous enhancement of the posterior segment of the right hepatic lobe which raises the suspicious for an infiltrative HCC.  8.  1.2 cm pancreatic head cyst. 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          Follow up/instructions: Patient is follow-up with hospice at home.  She can follow-up with her primary care provider as needed but hospice should be taking over the primary needs of the patient.    Pending test results at discharge:      Unresulted Labs Ordered in the Past 30 Days of this Admission       No orders found for last 31 day(s).             Discharge Orders      Hospice Referral      Reason for your hospital stay    Ascites, metastatic carcinoma concerning for hepatocellular     Follow-up and recommended labs and tests     Follow up with primary care provider, Caroline Fernandez, as needed    Follow up with hospice at home     Activity    Your activity upon discharge: activity as tolerated     No CPR- Do NOT Intubate     Diet    Follow this diet upon discharge: Orders Placed This Encounter      Regular Diet Adult       Discharge Disposition   Discharged to home  Condition at discharge: Stable      Consultations This Hospital Stay   PALLIATIVE CARE ADULT IP CONSULT  HEMATOLOGY & ONCOLOGY IP CONSULT  CARE MANAGEMENT / SOCIAL WORK IP CONSULT    Code Status   No CPR- Do NOT Intubate    Time Spent on this Encounter   I, Yony Mena MD, personally saw the patient today and spent greater than 30 minutes discharging this patient.  Discussed with nursing, social work/case management, discussed with daughter bedside along with son, granddaughters by phone        This document was created using voice recognition technology.  Please excuse any  typographical errors that may have occurred.  Please call with any questions.       Yony Mena MD  [place] [address] [facility]  [address]  [facility] [address]  Phone: [phone]  Fax: [phone]  ______________________________________________________________________    Physical Exam   Vital Signs: Temp: 97.7  F (36.5  C) Temp src: Oral BP: (!) 145/51 Pulse: 86   Resp: 18 SpO2: 94 % O2 Device: None (Room air)    Weight: 134 lbs 11.22 oz      GENERAL: Pleasant and cooperative.  None English speaking, daughter bedside interpreting, no acute distress.  Lying in bed, appears elderly and frail though better than I would expect given her metastatic cancer.  Patient is very personable and smiling.  HEENT: MMM  CHEST: Clear to auscultation. Normal breath sounds.  Decreased at the bases  CV: Regular rate and rhythm   ABDOMEN: Bowel sounds present.  Moderately distended, some discomfort with palpation right upper quadrant     EXTREMETIES: No clubbing, cyanosis, or ischemia.  Moves all extremities, no edema  NEUROLOGIC: No focal deficits         Discharge Medications   Current Discharge Medication List        START taking these medications    Details   spironolactone (ALDACTONE) 25 MG tablet Take 1 tablet (25 mg) by mouth daily for 30 days  Qty: 30 tablet, Refills: 0    Associated Diagnoses: Other ascites           CONTINUE these medications which have NOT CHANGED    Details   amLODIPine (NORVASC) 10 MG tablet Take 10 mg by mouth daily      escitalopram (LEXAPRO) 10 MG tablet Take 10 mg by mouth daily           Allergies   Allergies   Allergen Reactions    Iodinated Contrast Media Other (See Comments)

## 2024-07-22 NOTE — CONSULTS
Care Management Initial Consult    General Information  Assessment completed with: Patient, Children, Yessy  Type of CM/SW Visit: Initial Assessment    Primary Care Provider verified and updated as needed: Yes   Readmission within the last 30 days:        Reason for Consult: discharge planning, end of life/hospice  Advance Care Planning: Advance Care Planning Reviewed: present on chart          Communication Assessment  Patient's communication style: spoken language (non-English)    Hearing Difficulty or Deaf: yes        Cognitive  Cognitive/Neuro/Behavioral: .WDL except  Level of Consciousness: alert  Arousal Level: opens eyes spontaneously  Orientation: other (see comments) (forgetful)             Living Environment:   People in home: child(charles), adult  Yessy  Current living Arrangements: house - 7 stairs  Able to return to prior arrangements: yes       Family/Social Support:  Care provided by: self  Provides care for: no one  Marital Status:   Children          Description of Support System: Involved, Supportive    Support Assessment: Adequate family and caregiver support    Current Resources:   Patient receiving home care services: No     Community Resources: None  Equipment currently used at home: cane, straight  Supplies currently used at home: None    Employment/Financial:  Employment Status: retired        Financial Concerns: none   Referral to Financial Worker: No       Does the patient's insurance plan have a 3 day qualifying hospital stay waiver?  No    Lifestyle & Psychosocial Needs:  Social Determinants of Health     Food Insecurity: No Food Insecurity (2/28/2024)    Received from [employer]    Food Insecurity     Worried About Running Out of Food in the Last Year: 1   Depression: At risk (2/15/2024)    Received from [employer]    PHQ-2     PHQ-2 TOTAL SCORE: 4   Housing Stability: Low Risk  (2/28/2024)    [facility]    Housing Stability     Unable to Pay for Housing in the Last Year: 1   Tobacco Use: Low Risk  (2/28/2024)    Received from [employer] [facility]    Patient History     Smoking Tobacco Use: Never     Smokeless Tobacco Use: Never     Passive Exposure: Not on file   Financial Resource Strain: Low Risk  (2/28/2024)    Received from [employer] [facility]    Financial Resource Strain     Difficulty of Paying Living Expenses: 3     Difficulty of Paying Living Expenses: Not on file   Alcohol Use: Not on file   Transportation Needs: No Transportation Needs (2/28/2024)    Received from [employer] [facility]    Transportation Needs     Lack of Transportation (Medical): 1   Physical Activity: Not on file   Interpersonal Safety: Not on file   Stress: Not on file   Social Connections: Socially Integrated (2/28/2024)    [facility]    Social Connections     Frequency of Communication with Friends and Family: 0   Health Literacy: Not on file       Functional Status:  Prior to admission patient needed assistance:   Dependent ADLs:: Independent  Dependent IADLs:: Independent  Pt helps her dtr with meal prep.    Mental Health Status:  Mental Health Status: No Current Concerns       Chemical Dependency Status:  Chemical Dependency Status: No Current Concerns             Values/Beliefs:  Spiritual, Cultural Beliefs, Gnosticist Practices, Values that affect care:            Values/Beliefs Comment: Undesignated      Care Management Discharge Note    Discharge Date: 07/22/2024       Discharge Disposition: Home, Hospice    Discharge Services: None    Discharge DME: None    Discharge Transportation: family or friend will provide - pt's dtr Yessy    Private pay costs discussed: Not applicable    Does the patient's insurance plan have a 3 day qualifying hospital stay waiver?  No    PAS Confirmation  Code: N/A  Patient/family educated on Medicare website which has current facility and service quality ratings: yes    Education Provided on the Discharge Plan: Yes  Persons Notified of Discharge Plans: Pt, [facility]  Patient/Family in Agreement with the Plan: yes    Handoff Referral Completed: No    Additional Information:  Evelyn met with the pt and her dtr.  They requested that the pt's dtr interpret.  The pt was up in her chair when Sw arrived.  Pt was in a pleasant mood.  Pt lives with dtr Yessy and Yessy's family.  Pt is independent at baseline and uses a can to assist with ambulation.  There are 7 stairs for the pt to ambulate at home, but Yessy reports that she does not have any trouble with them.  Yessy confirmed that they would like hospice at discharge.  Eevlyn printed off the hospice list from Medicare and they have requested that a referral be sent to [facility].  There are no equipment needs at this time.  The pt also does not need to be seen today for an admission per the physician.  Evelyn called Crys with [facility] and faxed her the pt's initial referral P: [phone] F: [phone].  They would be able to admit the pt to services tomorrow.  Crys will call Yessy to work out the details for the admission time.  Evelyn faxed [facility] the pt's discharge orders.  Yessy will provide transport home for the pt today.    Evelyn will continue with discharge planning and will be available as needed until discharge.    ISAÍAS Diehl, [facility]  Inpatient Care Coordination  [facility]  [phone]

## 2024-07-22 NOTE — PROGRESS NOTES
Pt was in Radiology today for a paracentesis. Procedure performed by Dr Hughes Procedure was tolerated well, vitals remained stable.900 cc's of cloudy yellow colored fluid removed. Fluid sent to lab for testing Written and verbal instructions were reviewed here is no evidence of bleeding upon discharge.  Pt left department in stable satisfactory condition with transport and daughter.

## 2024-07-22 NOTE — DISCHARGE SUMMARY
Discharge Note    Patient discharged to home via private vehicle  accompanied by daughter.  IV: Discontinued  Prescriptions printed and given to patient/family.   Belongings reviewed and sent with patient.   Home medications returned to patient: NA  Equipment sent with: patient, N/A.   patient and family verbalizes understanding of discharge instructions. AVS given to patient.  Additional education completed?  N/A

## 2024-07-22 NOTE — PLAN OF CARE
"Goal Outcome Evaluation:      Plan of Care Reviewed With: patient, child    Overall Patient Progress: improvingOverall Patient Progress: improving    Outcome Evaluation: Pt alert w/ forgetfullness. Daughter at bedside interpreting. On RA. chava pain. IV SL. up SBA. Plan is for paracentesis today    Temp: 98  F (36.7  C) Temp src: Oral BP: (!) 143/61 Pulse: 83   Resp: 16 SpO2: 92 % O2 Device: None (Room air)         Problem: Adult Inpatient Plan of Care  Goal: Plan of Care Review  Description: The Plan of Care Review/Shift note should be completed every shift.  The Outcome Evaluation is a brief statement about your assessment that the patient is improving, declining, or no change.  This information will be displayed automatically on your shift  note.  Outcome: Progressing  Flowsheets (Taken 7/22/2024 0602)  Outcome Evaluation: Pt alert w/ forgetfullness. Daughter at bedside interpreting. On RA. chava pain. IV SL. up SBA. Plan is for paracentesis today  Plan of Care Reviewed With:   patient   child  Overall Patient Progress: improving  Goal: Patient-Specific Goal (Individualized)  Description: You can add care plan individualizations to a care plan. Examples of Individualization might be:  \"Parent requests to be called daily at 9am for status\", \"I have a hard time hearing out of my right ear\", or \"Do not touch me to wake me up as it startles  me\".  Outcome: Progressing  Goal: Absence of Hospital-Acquired Illness or Injury  Outcome: Progressing  Intervention: Prevent Skin Injury  Recent Flowsheet Documentation  Taken 7/22/2024 0100 by Vanita Goodson RN  Body Position: position changed independently  Intervention: Prevent Infection  Recent Flowsheet Documentation  Taken 7/22/2024 0100 by Vanita Goodson, RN  Infection Prevention: rest/sleep promoted  Goal: Optimal Comfort and Wellbeing  Outcome: Progressing  Goal: Readiness for Transition of Care  Outcome: Progressing     "

## 2024-07-22 NOTE — ED NOTES
"[facility]  ED Nurse Handoff Report    ED Chief complaint: Abdominal Pain  . ED Diagnosis:   Final diagnoses:   HCC (hepatocellular carcinoma) (H)   Malignant neoplasm metastatic to lung, unspecified laterality (H)   Cirrhosis of liver with ascites, unspecified hepatic cirrhosis type (H)   Bilateral pleural effusion   Portal vein thrombosis       Allergies:   Allergies   Allergen Reactions    Iodinated Contrast Media Other (See Comments)       Code Status: Full Code    Activity level - Baseline/Home:  independent.  Activity Level - Current:   standby.   Lift room needed: No.   Bariatric: No   Needed: Yes   Isolation: No.   Infection: Not Applicable.     Respiratory status: Room air    Vital Signs (within 30 minutes):   Vitals:    07/21/24 1641 07/21/24 1646 07/21/24 1704 07/21/24 1954   BP: (!) 163/73 (!) 177/86 (!) 155/62 (!) 168/80   Pulse: 85 70 66 81   Resp: 17      Temp: 97  F (36.1  C)      TempSrc: Temporal      SpO2: 96% 95%  96%   Weight: 61.1 kg (134 lb 11.2 oz)      Height: 1.6 m (5' 3\")          Cardiac Rhythm:  ,      Pain level:    Patient confused:  forgetful .   Patient Falls Risk: nonskid shoes/slippers when out of bed, arm band in place, patient and family education, assistive device/personal items within reach, activity supervised, mobility aid in reach, and room door open.   Elimination Status:  DTV      Patient Report - Initial Complaint: Pt does not speak english, daughter is translating, pt was offered a . Per daughter pt has had increased abdominal distention over the past week. She states that she thought it was constipation but she was given miralax and pt is having regular Bms. Daughter states normally pt passes a lot of gas but she has noticed a marked decline in the amount over the past couple of days. Daughter also states that she has been more SOB. Pt does have a hx of liver issues due to hep C   Focused Assessment:  89 year old female with " history of CKD, hepatitis C, hepatocellular carcinoma, and liver cirrhosis who presents with complaint of abdominal discomfort and bloating and dyspnea on exertion with cough.  Patient is mandrin speaking, she is here with her daughter who speaks English, she was offered  services however she and the daughter state they prefer for daughter to translate.  Patient lives with her daughter.  Daughter first noted her mother was having abdominal discomfort and bloating about 1 week ago.  Her daughter gave her MiraLAX for several days, she had several loose stools however abdominal bloating and discomfort have not resolved.  Abdominal discomfort is in the lower left quadrant, she is unable to describe, not sure if it radiates.  Patient also has complaint of a nonproductive cough and dyspnea on exertion for the last 1 to 2 days.  Patient and daughter went to urgent care today who referred them to emergency department for further workup.  Patient denies fever and chills, headache, dizziness, sore throat, chest pain, nausea and vomiting, diarrhea, dysuria and hematuria.  Patient denies history of ascites, ever needing paracentesis.  Patient has history of hepatitis C however she did receive treatment for this 10 years ago.      Abnormal Results:   Labs Ordered and Resulted from Time of ED Arrival to Time of ED Departure   D DIMER QUANTITATIVE - Abnormal       Result Value    D-Dimer Quantitative 3.76 (*)    COMPREHENSIVE METABOLIC PANEL - Abnormal    Sodium 133 (*)     Potassium 4.0      Carbon Dioxide (CO2) 21 (*)     Anion Gap 14      Urea Nitrogen 13.1      Creatinine 0.75      GFR Estimate 72      Calcium 9.6      Chloride 98      Glucose 112 (*)     Alkaline Phosphatase 168 (*)      (*)     ALT 58 (*)     Protein Total 7.1      Albumin 3.9      Bilirubin Total 1.6 (*)    TROPONIN T, HIGH SENSITIVITY - Abnormal    Troponin T, High Sensitivity 20 (*)    TROPONIN T, HIGH SENSITIVITY - Abnormal    Troponin  T, High Sensitivity 17 (*)    LIPASE - Normal    Lipase 32     NT PROBNP INPATIENT - Normal    N terminal Pro BNP Inpatient 309     INFLUENZA A/B, RSV, & SARS-COV2 PCR - Normal    Influenza A PCR Negative      Influenza B PCR Negative      RSV PCR Negative      SARS CoV2 PCR Negative     CBC WITH PLATELETS AND DIFFERENTIAL    WBC Count 6.2      RBC Count 3.93      Hemoglobin 11.9      Hematocrit 37.1      MCV 94      MCH 30.3      MCHC 32.1      RDW 14.2      Platelet Count 183      % Neutrophils 67      % Lymphocytes 18      % Monocytes 14      % Eosinophils 1      % Basophils 0      % Immature Granulocytes 1      NRBCs per 100 WBC 0      Absolute Neutrophils 4.1      Absolute Lymphocytes 1.1      Absolute Monocytes 0.8      Absolute Eosinophils 0.1      Absolute Basophils 0.0      Absolute Immature Granulocytes 0.0      Absolute NRBCs 0.0          CT Chest (PE) Abdomen Pelvis w Contrast   Final Result   IMPRESSION:   1.  No pulmonary emboli.   2.  Multiple bilateral pulmonary nodules/masses which are most likely metastatic.   3.  Small bilateral pleural effusions, greater on the left.   4.  Mediastinal and right hilar adenopathy which is likely metastatic.   5.  Cirrhosis. Evidence of portal hypertension given by the moderate ascites and lower esophageal varices.   6.  Nonocclusive thrombus within the left portal vein. Occluded right portal vein branches. These demonstrate arterial phase hyperenhancement which is highly suspicious for tumor thrombus. Most likely from HCC.   7.  Ill-defined hepatic mass is not seen however there is heterogeneous enhancement of the posterior segment of the right hepatic lobe which raises the suspicious for an infiltrative HCC.   8.  1.2 cm pancreatic head cyst. 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          Treatments provided: See MAR  Family Comments: daughter at bedside  OBS brochure/video discussed/provided to patient:  N/A  ED Medications:   Medications    diphenhydrAMINE (BENADRYL) injection 25 mg (25 mg Intravenous $Given 7/21/24 1900)   iopamidol (ISOVUE-370) solution 500 mL (68 mLs Intravenous $Given 7/21/24 1912)   CT scan flush (78 mLs Intravenous $Given 7/21/24 1912)       Drips infusing:  No  For the majority of the shift this patient was Green.   Interventions performed were .    Sepsis treatment initiated: No    Cares/treatment/interventions/medications to be completed following ED care:   RECEIVING UNIT ED HANDOFF REVIEW    Above ED Nurse Handoff Report was reviewed: Yes  Reviewed by: Bianca Wood RN on July 21, 2024 at 10:29 PM   RISA Menard called the ED to inform them the note was read: No     ED Nurse Name: Ana Scott RN  9:29 PM

## 2024-08-03 PROBLEM — R18.0 MALIGNANT ASCITES (H): Status: ACTIVE | Noted: 2024-01-01

## 2024-08-03 PROBLEM — J96.01 ACUTE RESPIRATORY FAILURE WITH HYPOXIA (H): Status: ACTIVE | Noted: 2024-01-01

## 2024-08-03 PROBLEM — S30.1XXA HEMATOMA OF LEFT FLANK, INITIAL ENCOUNTER: Status: ACTIVE | Noted: 2024-01-01

## 2024-08-03 NOTE — ED TRIAGE NOTES
Pt arrives in wheelchair with son and daughter for bruising on left side of abdomen that is spreading to groin. Pt diagnosed with liver CA recently and placed on hospice per daughter. Pt has bloated abdomen and ascites and has paracentesis two Mondays ago. Pt has uncontrollable leg spasms that have worsened per family.

## 2024-08-04 NOTE — PROGRESS NOTES
Brief update:  Patient & family seen at bedside.  We had a longer discussion about her underlying illness and her reason for acute presentation.      They report they had previous discussion about hospice, but they are not ready for this.  She continues to have fair quality of life, her mentation is appropriate, she can continue to do things that are meaningful to her.      They would like to discuss her prognosis etc with an oncologist which I think is entirely reasonable.     Plan:  -LOBITO natarajan & para completed today  -Regular diet  -Urine sodium, urine osm, serum osm for work up of hyponatremia  -Recheck BMP in am  -Oncology consultation  -Wean o2 as able    Anticipate discharge tomorrow 8/5.    Jose Manuel Mars, DO

## 2024-08-04 NOTE — PROGRESS NOTES
"PRIMARY DIAGNOSIS: \"GENERIC\" NURSING  OUTPATIENT/OBSERVATION GOALS TO BE MET BEFORE DISCHARGE:  ADLs back to baseline: No    Activity and level of assistance: Up with assistance.    Pain status: Pain free.    Return to near baseline physical activity: No     Discharge Planner Nurse   Safe discharge environment identified: Yes  Barriers to discharge: Yes       Entered by: Annabelle Taylor RN 08/04/2024 1:54 PM       "

## 2024-08-04 NOTE — PLAN OF CARE
PRIMARY DIAGNOSIS: hypoxia, ascites  OUTPATIENT/OBSERVATION GOALS TO BE MET BEFORE DISCHARGE:  ADLs back to baseline: No    Activity and level of assistance: Up with standby assistance.    Pain status: No pain since 0200 this am.     Return to near baseline physical activity: No     End of Shift Summary  For vital signs and complete assessments, please see documentation flowsheets.     Pertinent assessments: VSS. A/O. O2 weaned from 2 - 1 L NC. Denies pain or nausea. LS dim w/ exp wheezes. BOWDEN, SOB at times. Family at bedside. Abdomen is distended. Voiding without difficulty. She does have restless legs. Up with SBA. Large bruising to her abdomen LLQ, left hip and left lower back.   Major Shift Events admit to [facility]  Treatment Plan: Planned paracentesis and thoracentesis 8/4 or 8/5. NPO since 0400. Symptom management.

## 2024-08-04 NOTE — ED PROVIDER NOTES
Emergency Department Note      History of Present Illness     Chief Complaint   Abdominal Pain      HPI   Vickie Aquino is a 89 year old female on hospice for history of metastatic hepatocellular carcinoma with malignant pleural effusions and ascites who presents with ecchymosis and pain to the left flank that the family first noticed overnight and has continued to worsen throughout the day.  Family denies any known trauma to the area.  She denies any pain elsewhere.  She has not had recent fevers, vomiting, diarrhea, urinary symptoms.  She has had intermittent uncontrolled movements of the left leg which have been ongoing recently.  She did have a paracentesis 2 weeks ago.  She is not currently on any pain medications.    Independent Historian   Family as detailed above.  Family also serves as Mandarin  to the patient.    Review of External Notes   I reviewed discharge summary from 7/20/2024 when patient was admitted with metastatic hepatocellular cancer and underwent a paracentesis.  Family wanted to focus on nonaggressive measures and patient was placed on hospice.    Past Medical History     Medical History and Problem List   Metastatic hepatocellular carcinoma    Medications   amLODIPine (NORVASC) 10 MG tablet  escitalopram (LEXAPRO) 10 MG tablet  spironolactone (ALDACTONE) 25 MG tablet        Surgical History   No past surgical history on file.    Physical Exam     Patient Vitals for the past 24 hrs:   BP Temp Temp src Pulse Resp SpO2   08/03/24 2251 -- -- -- -- -- 90 %   08/03/24 2246 -- -- -- -- -- 91 %   08/03/24 2241 139/55 -- -- 65 -- --   08/03/24 2221 -- -- -- -- -- 90 %   08/03/24 2211 -- -- -- -- -- 91 %   08/03/24 2141 -- -- -- -- -- 91 %   08/03/24 2131 (!) 147/64 -- -- 66 -- 92 %   08/03/24 2111 -- -- -- -- -- 90 %   08/03/24 2101 (!) 152/64 -- -- 66 -- --   08/03/24 2036 -- -- -- 78 -- 93 %   08/03/24 2031 (!) 143/57 -- -- -- -- --   08/03/24 1832 (!) 163/73 98.8  F (37.1  C) Temporal 60  22 94 %     Physical Exam  Vitals and nursing note reviewed.   Constitutional:       General: She is not in acute distress.     Appearance: She is not ill-appearing.   HENT:      Head: Normocephalic and atraumatic.      Right Ear: External ear normal.      Left Ear: External ear normal.      Nose: Nose normal.      Mouth/Throat:      Mouth: Mucous membranes are moist.   Eyes:      Extraocular Movements: Extraocular movements intact.      Conjunctiva/sclera: Conjunctivae normal.   Cardiovascular:      Rate and Rhythm: Normal rate and regular rhythm.      Heart sounds: No murmur heard.  Pulmonary:      Effort: Pulmonary effort is normal. No respiratory distress.      Breath sounds: Normal breath sounds. No wheezing, rhonchi or rales.   Abdominal:      General: Abdomen is flat. Bowel sounds are normal. There is no distension.      Palpations: Abdomen is soft.      Tenderness: There is abdominal tenderness in the right lower quadrant and left lower quadrant. There is no guarding or rebound.   Musculoskeletal:         General: No deformity or signs of injury.      Cervical back: Normal range of motion and neck supple.      Lumbar back: Tenderness present.        Back:    Skin:     General: Skin is warm and dry.      Findings: No rash.   Neurological:      Mental Status: She is alert.      Motor: Tremor (Occasional spasms of the left lower extremity) present.           Diagnostics     Lab Results   Labs Ordered and Resulted from Time of ED Arrival to Time of ED Departure   INR - Abnormal       Result Value    INR 1.21 (*)    COMPREHENSIVE METABOLIC PANEL - Abnormal    Sodium 127 (*)     Potassium 4.7      Carbon Dioxide (CO2) 19 (*)     Anion Gap 13      Urea Nitrogen 32.4 (*)     Creatinine 0.85      GFR Estimate 62      Calcium 9.4      Chloride 95 (*)     Glucose 124 (*)     Alkaline Phosphatase 203 (*)      (*)     ALT 82 (*)     Protein Total 6.5      Albumin 3.4 (*)     Bilirubin Total 3.6 (*)    CBC WITH  PLATELETS AND DIFFERENTIAL - Abnormal    WBC Count 9.4      RBC Count 3.83      Hemoglobin 11.5 (*)     Hematocrit 36.1      MCV 94      MCH 30.0      MCHC 31.9      RDW 15.9 (*)     Platelet Count 197      % Neutrophils 75      % Lymphocytes 8      % Monocytes 14      % Eosinophils 0      % Basophils 0      % Immature Granulocytes 2      NRBCs per 100 WBC 0      Absolute Neutrophils 7.1      Absolute Lymphocytes 0.8      Absolute Monocytes 1.4 (*)     Absolute Eosinophils 0.0      Absolute Basophils 0.0      Absolute Immature Granulocytes 0.2      Absolute NRBCs 0.0     PARTIAL THROMBOPLASTIN TIME - Normal    aPTT 32         Imaging   CT Abdomen Pelvis w Contrast   Final Result   IMPRESSION:    1.  Cirrhosis with 5 cm segment 7 liver lesion highly concerning for hepatocellular carcinoma.      2.  Bibasilar pulmonary metastases similar over short interval follow-up. Increasing bibasilar atelectasis and pleural effusions.      3.  Small to moderate volume ascites, increased compared to prior study.      4.  Intramuscular hematoma within the left lateral abdominal wall musculature. No active bleeding demonstrated. No solid organ injury.            Independent Interpretation   None    ED Course      Medications Administered   Medications   morphine (PF) injection 2 mg (2 mg Intravenous $Given 8/3/24 2030)   ondansetron (ZOFRAN) injection 4 mg (4 mg Intravenous $Given 8/3/24 2027)   ipratropium - albuterol 0.5 mg/2.5 mg/3 mL (DUONEB) neb solution 3 mL (3 mLs Nebulization $Given 8/3/24 2301)   iopamidol (ISOVUE-370) solution 500 mL (68 mLs Intravenous $Given 8/3/24 2226)   for CT scan flush use (57 mLs Intravenous $Given 8/3/24 2226)       Procedures   Procedures     Discussion of Management   Admitting Hospitalist, Dr. Galaviz  Hospice nurse    ED Course   ED Course as of 08/03/24 2358   Sat Aug 03, 2024   2345 I discussed with a nurse from the patient's hospice provider.   2357 I discussed with Dr. Galaviz accepts admission        Additional Documentation  None    Medical Decision Making / Diagnosis     CMS Diagnoses: None    MIPS       None    MDM   Vickie Aquino is a 89 year old female who presents with primary complaint of pain and ecchymosis to the left flank that the family noticed overnight.  Unclear etiology at this point.  No known trauma but is possible there could have been an occult trauma the family is not aware of.  Otherwise it could be a retroperitoneal hemorrhage from some etiology.  Will plan to check labs and a CT to further evaluate.  Patient is on hospice and family does not want overly aggressive interventions so we will concentrate on symptomatic improvement otherwise.    2355 patient's CT shows that the patient has a subcutaneous hematoma in the area of concern.  More concern is that she has increasing bilateral pleural effusions and she is now hypoxic in the upper 80s.  She is in no respiratory distress but has slightly labored breathing.  I had an extensive discussion with the patient's family about whether she would want thoracentesis or other care done to improve her breathing and quality of life.  I also discussed with the hospice nurse.  Will plan to admit to the hospital for oxygen therapy and possible thoracentesis and paracentesis.  I discussed with Dr. Galaviz accepts admission.    Disposition   The patient was admitted to the hospital.     Diagnosis     ICD-10-CM    1. Acute respiratory failure with hypoxia (H)  J96.01       2. Bilateral pleural effusion  J90       3. Malignant ascites (H28)  R18.0       4. Hematoma of left flank, initial encounter  S30.1XXA            Discharge Medications   New Prescriptions    No medications on file         MD Francine Saul Shaun M, MD  08/03/24 3022

## 2024-08-04 NOTE — PHARMACY-ADMISSION MEDICATION HISTORY
Pharmacist Admission Medication History    Admission medication history is complete. The information provided in this note is only as accurate as the sources available at the time of the update.    Information Source(s): Family member via in-person    Pertinent Information: none    Changes made to PTA medication list:  Added: None  Deleted: None  Changed: None    Allergies reviewed with patient and updates made in EHR: yes    Medication History Completed By: Kermit Severson, RPH 8/4/2024 9:36 AM    PTA Med List   Medication Sig Last Dose    amLODIPine (NORVASC) 10 MG tablet Take 10 mg by mouth every evening 8/3/2024 at pm    escitalopram (LEXAPRO) 10 MG tablet Take 10 mg by mouth daily 8/3/2024 at am    spironolactone (ALDACTONE) 25 MG tablet Take 1 tablet (25 mg) by mouth daily for 30 days 8/3/2024 at am

## 2024-08-04 NOTE — PLAN OF CARE
"To Do:  End of Shift Summary  For vital signs and complete assessments, please see documentation flowsheets.     Pertinent assessments: Pt A&Ox4, VSS on 1.5L NC. Afebrile. Denies pain, nausea. SOB at times with ambulation in the room. Family at bedside to assist with cares. Abd distended. NPO. Bruising noted on left side of abdomen and hip. PIV SL. Restless legs noted, PRN mirapex. Urine sample collected.  Family at bedside, bed alarm on.       Major Shift Events: Paracentesis and thoracentesis done today.     Treatment Plan: paracentesis and thoracentesis results. Symptom management.     Bedside Nurse: Annabelle Taylor RN   Problem: Adult Inpatient Plan of Care  Goal: Plan of Care Review  Description: The Plan of Care Review/Shift note should be completed every shift.  The Outcome Evaluation is a brief statement about your assessment that the patient is improving, declining, or no change.  This information will be displayed automatically on your shift  note.  Outcome: Progressing  Flowsheets (Taken 8/4/2024 1551)  Outcome Evaluation: VSS on 1.5L NC. Thora done today. Family at bedside.  Plan of Care Reviewed With: patient  Overall Patient Progress: improving  Goal: Patient-Specific Goal (Individualized)  Description: You can add care plan individualizations to a care plan. Examples of Individualization might be:  \"Parent requests to be called daily at 9am for status\", \"I have a hard time hearing out of my right ear\", or \"Do not touch me to wake me up as it startles  me\".  Outcome: Progressing  Goal: Absence of Hospital-Acquired Illness or Injury  Outcome: Progressing  Intervention: Identify and Manage Fall Risk  Recent Flowsheet Documentation  Taken 8/4/2024 1300 by Annabelle Taylor RN  Safety Promotion/Fall Prevention: safety round/check completed  Intervention: Prevent Infection  Recent Flowsheet Documentation  Taken 8/4/2024 1300 by Annabelle Taylor RN  Infection Prevention: cohorting utilized  Goal: Optimal " Comfort and Wellbeing  Outcome: Progressing  Goal: Readiness for Transition of Care  Outcome: Progressing     Problem: Skin Injury Risk Increased  Goal: Skin Health and Integrity  Outcome: Progressing   Goal Outcome Evaluation:      Plan of Care Reviewed With: patient    Overall Patient Progress: improvingOverall Patient Progress: improving    Outcome Evaluation: VSS on 1.5L LACI Dutton done today. Family at bedside.

## 2024-08-04 NOTE — PLAN OF CARE
PRIMARY DIAGNOSIS: hypoxia and malignant ascities  OUTPATIENT/OBSERVATION GOALS TO BE MET BEFORE DISCHARGE:  ADLs back to baseline: No    Activity and level of assistance: Up with standby assistance.    Pain status: Received IV pain med in ED, denies pain presently.    Return to near baseline physical activity: No

## 2024-08-04 NOTE — H&P
History and Physical     Vickie Aquino MRN# [id number]   YOB: 1926 Age: 89 year old      Date of Admission:  8/3/2024    Primary care provider: Caroline Fernandez          Assessment and Plan:     Summary of Stay: Vickie Aquino is a 89 year old female with a history of  htn, HCV, cirrhosis, recently found to have suspected HCC with mets to the lung, CKD 3 with baseline creat 0.7-0.9, depression ,currently DNR/I-enrolled in hospice admitted on 8/3/2024 with new bruising along left hip/abdomen, recurrent ascites, pleural effusions with mild hypoxia, and concern for progressive restless leg type symptoms.     She follows with [facility] for cirrhosis and was noted to have a liver lesion ~ 2 years ago and family decided not to pursue further evaluation due to age and felt to be slow growing.  She was just admitted 7/21-7/22 with progressive abdominal bloating due to ascites. CT CAP during that admission  multiple bilateral pulmonary nodules/masses most likely metastatic disease. Small bilateral pleural effusions were noted, left greater than right. Mediastinal and right hilar adenopathy was noted, likely metastatic. There was cirrhosis with evidence of portal hypertension with moderate ascites and lower esophageal varices. There was nonocclusive thrombus in the left portal vein and occluded right portal vein branches; these demonstrated arterial phase hyperenhancement which was highly suspicious for tumor thrombus, most likely from hepatocellular carcinoma. There was ill-defined hepatic mass suspicious for an infiltrative hepatocellular carcinoma. 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 She underwent paracentesis 7/22 with 900 ml of fluid removed.  Interestingly the fluid was negative for malignancy.  She was discharged home to initiate home hospice.      She is brought into the ER today for 2 main reasons, one is of an enlarging bruise on left hip/abdominal area.  First noticed ~ 3 am on  the morning of presentation,  initially looked to just be mostly around her hip but later developed bruising on her abdomen.  No falls/trauma noted by patient or family.  They've also noticed increasing of leg spasms, she's had them mildly for several weeks but they've become bothersome to Vickie as they have increased.  She also states that she is having increasing abdominal girth which is uncomfortable, and notes that she is feeling mildly SOB     ER VS with mild hypertension.  Sats in the upper 80's to low 90's and so is on low level oxygen by NC  CMP with progressive elevation in LFTs, bili is also climbing.  Na is low at 127.  CO2 19, AG 13  CBC with normocytic anemia 11.5, platelets 197  INR 1.21    CT A/P  Cirrhosis with 5 cm segment 7 liver lesion highly concerning for hepatocellular carcinoma.  Bibasilar pulmonary metastases similar over short interval follow-up. Increasing bibasilar atelectasis and pleural effusions.Small to moderate volume ascites, increased compared to prior study.Intramuscular hematoma within the left lateral abdominal wall musculature. No active bleeding demonstrated. No solid organ injury.    I am asked to admit for mild hypoxia 2/2 ascites/pleural effusions, RLS, and unexplained hematoma     Problem List:   Hypoxia  Ascites/bilateral pleural effusions   Mildly symptomatic.  At her prior hospitalization sats looked to be in the mid 90's consistently. Pleural effusions present on CT at last hospitalization but appear to have enlarged per my review. Her exam is notable for protuberant and taut - although non tender- abdomen which I suspect may also be playing a role.  I believe that her ascites/effusions are cirrhosis related rather than being malignant in etiology   -therapeutic paracentesis/thoracentesis ordered for Sunday although may not be able to be completed until Monday   -probably should get set up for prn bryan/thoras in OP setting to help minimize hospitalizations        RLS  She looks to have restless legs and is frequently having jumping legs during my interview and exam  -trial prn pramipexole     Intramuscular abdominal wall hematoma  Hematoma around left hip  Both patient and family deny any fall/trauma.  Her INR is minimally elevated at 1.27 and her platelet count is normal at 200 so a bit unclear how/why she developed this     Hyponatremia  NAGMA  Prior Na 133 2 weeks ago and has dropped to 127.  Presumably due to cirrhosis.  NAGMA with CO2 19, ? etiology  -monitor, avoid IVF as this will likely just go into her abdomen.   -lemuel bmp in am     Cirrhosis with ascites/esophageal varices, portal vein thrombosis noted on CT last admission  Hx of HCV  Suspected metastatic HCC   Portal vein thrombosis (PVT  Cont pta regimen of spironolactone  Difficult to anticoagulate in setting of spontaneous hematoma.  I had not noticed this at the time of admission and did not get a chance to discuss it with family members. Likely combination of clot/tumor.  She has e/o varices on CT scan and now with a spontaneous hematoma so not sure if AC would be most appropriate for her.  Reviewed UTD for PVT and PVT in association with HCC and guidance was not really given.  Would curbside [facility] when able to discuss whether AC appropriate especially with rising LFTs over the past 2 weeks and increasing PVT  -cont with pta spironolactoney     Hypertension   Resumed pta amlodipine 10 mg q pm    Depression   Resumed pta escitalopram     CKD   GFRs in the low 60's            DVT Prophylaxis: Pneumatic Compression Devices  Code Status: DNR / DNI  Functional Status: lives with her dtr and ambulates independently  Mena: not indicated  Access:   PIV          Time spent 75 minutes reviewing epic including notes/labs/prior hx, current medications.  In addition to interviewing and examining the patient, updated patient and family regarding plan of care          Chief Complaint:     Bruising  hip/abdomen, RLS         History of Present Illness:    Vickie Aquino is a 89 year old female with a history of  htn, HCV, cirrhosis, recently found to have suspected HCC with mets to the lung, CKD 3 with baseline creat 0.7-0.9, depression ,currently DNR/I-enrolled in hospice admitted on 8/3/2024 with new bruising along left hip/abdomen, recurrent ascites, pleural effusions with mild hypoxia, and concern for progressive restless leg type symptoms.     She follows with [facility] for cirrhosis and was noted to have a liver lesion ~ 2 years ago and family decided not to pursue further evaluation due to age and felt to be slow growing.  She was just admitted 7/21-7/22 with progressive abdominal bloating due to ascites. CT CAP during that admission  multiple bilateral pulmonary nodules/masses most likely metastatic disease. Small bilateral pleural effusions were noted, left greater than right. Mediastinal and right hilar adenopathy was noted, likely metastatic. There was cirrhosis with evidence of portal hypertension with moderate ascites and lower esophageal varices. There was nonocclusive thrombus in the left portal vein and occluded right portal vein branches; these demonstrated arterial phase hyperenhancement which was highly suspicious for tumor thrombus, most likely from hepatocellular carcinoma. There was ill-defined hepatic mass suspicious for an infiltrative hepatocellular carcinoma. 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 She underwent paracentesis 7/22 with 900 ml of fluid removed.  Interestingly the fluid was negative for malignancy.  She was discharged home to initiate home hospice.      She is brought into the ER today for 2 main reasons, one is of an enlarging bruise on left hip/abdominal area.  First noticed ~ 3 am on the morning of presentation,  initially looked to just be mostly around her hip but later developed bruising on her abdomen.  No falls/trauma noted by patient or family.  They've also noticed  increasing of leg spasms, she's had them mildly for several weeks but they've become bothersome to Vickie as they have increased.  She also states that she is having increasing abdominal girth which is uncomfortable, and notes that she is feeling mildly SOB     ER VS with mild hypertension.  Sats in the upper 80's to low 90's and so is on low level oxygen by NC  CMP with progressive elevation in LFTs, bili is also climbing.  Na is low at 127.  CO2 19, AG 13  CBC with normocytic anemia 11.5, platelets 197  INR 1.21    CT A/P  Cirrhosis with 5 cm segment 7 liver lesion highly concerning for hepatocellular carcinoma.  Bibasilar pulmonary metastases similar over short interval follow-up. Increasing bibasilar atelectasis and pleural effusions.Small to moderate volume ascites, increased compared to prior study.Intramuscular hematoma within the left lateral abdominal wall musculature. No active bleeding demonstrated. No solid organ injury.    I am asked to admit for mild hypoxia 2/2 ascites/pleural effusions, RLS, and unexplained hematoma       The history is obtained in discussion with the ER provider Mathew Escamilla MD, the patient who participates only minimally, and dtr and son (?in law) and I think granddaughter all with excellent reliability     Epic and Care everywhere were extensively reviewed        Past Medical History:     Past Medical History:   Diagnosis Date    Benign essential hypertension     Cirrhosis of liver (H)     Depression     Hepatitis C     Malignant neoplasm of liver, primary (H)     presumed HCC and metastatic             Past Surgical History:   I have reviewed this patient's past surgical history          Social History:     Social History     Tobacco Use    Smoking status: Never    Smokeless tobacco: Not on file   Substance Use Topics    Alcohol use: Not Currently             Family History:   I have reviewed this patient's family history         Allergies:   No Active Allergies           Medications:   Amlodipine 10 mg every day  Spironolactone  Escitalopram             Review of Systems:     A Comprehensive greater than 10 system review of systems was carried out.  Pertinent positives and negatives are noted above.  Otherwise negative for contributory information.           Physical Exam:   Blood pressure 139/55, pulse 65, temperature 98.8  F (37.1  C), temperature source Temporal, resp. rate 22, SpO2 90%.  Exam:    General:  Pleasant nad looks stated age  HEENT:  Head nc/at sclera mild icterus  PERR Neck is supple  Lungs: cta b nl effort absent in the bases   CV:  RRR no m/r/g no le edema  Abd: belly taut but non tender bs hypoactive   Neuro:  Cn 2-12 grossly intact and parisi   Alert and oriented affect appropriate   Skin:  W/d no c/c               Data:     Results for orders placed or performed during the hospital encounter of 08/03/24   CT Abdomen Pelvis w Contrast     Status: None    Narrative    EXAM: CT ABDOMEN PELVIS W CONTRAST  LOCATION: [facility]  DATE: 8/3/2024    INDICATION: left flank pain and bruising  COMPARISON: CT from 7/21/2024.  TECHNIQUE: CT scan of the abdomen and pelvis was performed following injection of IV contrast. Multiplanar reformats were obtained. Dose reduction techniques were used.  CONTRAST: 68mL Isovue 370    FINDINGS:   LOWER CHEST: Redemonstrated bibasilar pulmonary metastases. Increased bibasilar atelectasis and bilateral pleural effusions. Large paraesophageal varices.    HEPATOBILIARY: Nondistended gallbladder. Cirrhotic liver morphology with an ill-defined hypodense mass in segment 7 measuring approximately 5 cm on image 47 of series 4. Intrahepatic portal vein thrombosis noted previously is less well demonstrated on   this exam due to motion artifact, though some clot is seen within the main portal vein on image 29 of series 3 and image 42 of series 5 which appears increased compared to prior study.    PANCREAS: Stable 11 mm cystic  lesion in the pancreatic head on image 83 series 4.    SPLEEN: Within normal limits for size.    ADRENAL GLANDS: Normal.    KIDNEYS/BLADDER: Lobulated left renal cortical contours. No hydronephrosis. Normal bladder.    BOWEL: The stomach is decompressed. No bowel obstruction or free air. Small to moderate volume ascites, increased compared to prior.    LYMPH NODES: Normal.    VASCULATURE: Aortoiliac atherosclerotic calcification.    PELVIC ORGANS: Ascites.    MUSCULOSKELETAL: Osteopenia with multilevel degenerative disc change. Similar superior endplate defect in the L1 vertebral body. There is some hyperdense expansion of the left lateral abdominal wall musculature, measuring 2.9 cm in thickness on image 106   of series 4 (measuring 1.6 cm at the same level on the contralateral side). There is no evidence for active bleeding.      Impression    IMPRESSION:   1.  Cirrhosis with 5 cm segment 7 liver lesion highly concerning for hepatocellular carcinoma.    2.  Bibasilar pulmonary metastases similar over short interval follow-up. Increasing bibasilar atelectasis and pleural effusions.    3.  Small to moderate volume ascites, increased compared to prior study.    4.  Intramuscular hematoma within the left lateral abdominal wall musculature. No active bleeding demonstrated. No solid organ injury.   INR     Status: Abnormal   Result Value Ref Range    INR 1.21 (H) 0.85 - 1.15   Partial thromboplastin time     Status: Normal   Result Value Ref Range    aPTT 32 22 - 38 Seconds   Comprehensive metabolic panel     Status: Abnormal   Result Value Ref Range    Sodium 127 (L) 135 - 145 mmol/L    Potassium 4.7 3.4 - 5.3 mmol/L    Carbon Dioxide (CO2) 19 (L) 22 - 29 mmol/L    Anion Gap 13 7 - 15 mmol/L    Urea Nitrogen 32.4 (H) 8.0 - 23.0 mg/dL    Creatinine 0.85 0.51 - 0.95 mg/dL    GFR Estimate 62 >60 mL/min/1.73m2    Calcium 9.4 8.8 - 10.4 mg/dL    Chloride 95 (L) 98 - 107 mmol/L    Glucose 124 (H) 70 - 99 mg/dL    Alkaline  Phosphatase 203 (H) 40 - 150 U/L     (H) 0 - 45 U/L    ALT 82 (H) 0 - 50 U/L    Protein Total 6.5 6.4 - 8.3 g/dL    Albumin 3.4 (L) 3.5 - 5.2 g/dL    Bilirubin Total 3.6 (H) <=1.2 mg/dL   CBC with platelets and differential     Status: Abnormal   Result Value Ref Range    WBC Count 9.4 4.0 - 11.0 10e3/uL    RBC Count 3.83 3.80 - 5.20 10e6/uL    Hemoglobin 11.5 (L) 11.7 - 15.7 g/dL    Hematocrit 36.1 35.0 - 47.0 %    MCV 94 78 - 100 fL    MCH 30.0 26.5 - 33.0 pg    MCHC 31.9 31.5 - 36.5 g/dL    RDW 15.9 (H) 10.0 - 15.0 %    Platelet Count 197 150 - 450 10e3/uL    % Neutrophils 75 %    % Lymphocytes 8 %    % Monocytes 14 %    % Eosinophils 0 %    % Basophils 0 %    % Immature Granulocytes 2 %    NRBCs per 100 WBC 0 <1 /100    Absolute Neutrophils 7.1 1.6 - 8.3 10e3/uL    Absolute Lymphocytes 0.8 0.8 - 5.3 10e3/uL    Absolute Monocytes 1.4 (H) 0.0 - 1.3 10e3/uL    Absolute Eosinophils 0.0 0.0 - 0.7 10e3/uL    Absolute Basophils 0.0 0.0 - 0.2 10e3/uL    Absolute Immature Granulocytes 0.2 <=0.4 10e3/uL    Absolute NRBCs 0.0 10e3/uL   Iron and iron binding capacity     Status: Abnormal   Result Value Ref Range    Iron 49 37 - 145 ug/dL    Iron Binding Capacity 204 (L) 240 - 430 ug/dL    Iron Sat Index 24 15 - 46 %   CBC with platelets differential     Status: Abnormal    Narrative    The following orders were created for panel order CBC with platelets differential.  Procedure                               Abnormality         Status                     ---------                               -----------         ------                     CBC with platelets and d...[[id number]]  Abnormal            Final result                 Please view results for these tests on the individual orders.

## 2024-08-04 NOTE — ED NOTES
[facility]  ED Nurse Handoff Report    ED Chief complaint: Abdominal Pain  . ED Diagnosis:   Final diagnoses:   Acute respiratory failure with hypoxia (H)   Bilateral pleural effusion   Malignant ascites (H28)   Hematoma of left flank, initial encounter       Allergies: No Active Allergies    Code Status: DNR / DNI    Activity level - Baseline/Home:  independent.  Activity Level - Current:   assist of 1.   Lift room needed: No.   Bariatric: No   Needed: No   Isolation: No.   Infection: Not Applicable.     Respiratory status: Nasal cannula    Vital Signs (within 30 minutes):   Vitals:    08/03/24 2221 08/03/24 2241 08/03/24 2246 08/03/24 2251   BP:  139/55     Pulse:  65     Resp:       Temp:       TempSrc:       SpO2: 90%  91% 90%       Cardiac Rhythm:  ,      Pain level:    Patient confused: No.   Patient Falls Risk: patient and family education.   Elimination Status: Has voided     Patient Report - Initial Complaint: Abdominal Pain.   Focused Assessment: history of metastatic hepatocellular carcinoma with malignant pleural effusions and ascites who presents with ecchymosis and pain to the left flank that the family first noticed overnight and has continued to worsen throughout the day. Family denies any known trauma to the area. She denies any pain elsewhere. She has not had recent fevers, vomiting, diarrhea, urinary symptoms. She has had intermittent uncontrolled movements of the left leg which have been ongoing recently. She did have a paracentesis 2 weeks ago. She is not currently on any pain medications.      Abnormal Results:   Labs Ordered and Resulted from Time of ED Arrival to Time of ED Departure   INR - Abnormal       Result Value    INR 1.21 (*)    COMPREHENSIVE METABOLIC PANEL - Abnormal    Sodium 127 (*)     Potassium 4.7      Carbon Dioxide (CO2) 19 (*)     Anion Gap 13      Urea Nitrogen 32.4 (*)     Creatinine 0.85      GFR Estimate 62      Calcium 9.4       Chloride 95 (*)     Glucose 124 (*)     Alkaline Phosphatase 203 (*)      (*)     ALT 82 (*)     Protein Total 6.5      Albumin 3.4 (*)     Bilirubin Total 3.6 (*)    CBC WITH PLATELETS AND DIFFERENTIAL - Abnormal    WBC Count 9.4      RBC Count 3.83      Hemoglobin 11.5 (*)     Hematocrit 36.1      MCV 94      MCH 30.0      MCHC 31.9      RDW 15.9 (*)     Platelet Count 197      % Neutrophils 75      % Lymphocytes 8      % Monocytes 14      % Eosinophils 0      % Basophils 0      % Immature Granulocytes 2      NRBCs per 100 WBC 0      Absolute Neutrophils 7.1      Absolute Lymphocytes 0.8      Absolute Monocytes 1.4 (*)     Absolute Eosinophils 0.0      Absolute Basophils 0.0      Absolute Immature Granulocytes 0.2      Absolute NRBCs 0.0     PARTIAL THROMBOPLASTIN TIME - Normal    aPTT 32          CT Abdomen Pelvis w Contrast   Final Result   IMPRESSION:    1.  Cirrhosis with 5 cm segment 7 liver lesion highly concerning for hepatocellular carcinoma.      2.  Bibasilar pulmonary metastases similar over short interval follow-up. Increasing bibasilar atelectasis and pleural effusions.      3.  Small to moderate volume ascites, increased compared to prior study.      4.  Intramuscular hematoma within the left lateral abdominal wall musculature. No active bleeding demonstrated. No solid organ injury.          Treatments provided: See MAR  Family Comments: bedside  OBS brochure/video discussed/provided to patient:  Yes  ED Medications:   Medications   morphine (PF) injection 2 mg (2 mg Intravenous $Given 8/3/24 2030)   ondansetron (ZOFRAN) injection 4 mg (4 mg Intravenous $Given 8/3/24 2027)   ipratropium - albuterol 0.5 mg/2.5 mg/3 mL (DUONEB) neb solution 3 mL (3 mLs Nebulization $Given 8/3/24 2301)   iopamidol (ISOVUE-370) solution 500 mL (68 mLs Intravenous $Given 8/3/24 2226)   for CT scan flush use (57 mLs Intravenous $Given 8/3/24 2226)       Drips infusing:  No  For the majority of the shift this patient  jackie Lea.   Interventions performed were n/a.    Sepsis treatment initiated: No    Cares/treatment/interventions/medications to be completed following ED care: n/a    ED Nurse Name: Donavon Lemons RN  12:23 AM    RECEIVING UNIT ED HANDOFF REVIEW    Above ED Nurse Handoff Report was reviewed: Yes  Reviewed by: Jess Lubin, COREY on August 4, 2024 at 1:09 AM   I Derian called the ED to inform them the note was read: Yes

## 2024-08-05 NOTE — PROGRESS NOTES
"PRIMARY DIAGNOSIS: \"GENERIC\" NURSING  OUTPATIENT/OBSERVATION GOALS TO BE MET BEFORE DISCHARGE:  ADLs back to baseline: No    Activity and level of assistance: Up with standby assistance.    Pain status: Pain free.    Return to near baseline physical activity: No     Discharge Planner Nurse   Safe discharge environment identified: Yes  Barriers to discharge: Yes       Entered by: SCAR ESCOBEDO RN 08/04/2024 7:06 PM     Please review provider order for any additional goals.   Nurse to notify provider when observation goals have been met and patient is ready for discharge.    Continues on 1.5L NC, sating low 90s, very exhausted, sleeping all afternoon. Per family they believe this is due to mirapex given this afternoon. Awaiting onc consult, recheck labs in AM.   "

## 2024-08-05 NOTE — CONSULTS
Palliative Care Consultation Note  [facility]      Patient: Vickie Aquino  Date of Admission:  8/3/2024    Requesting Clinician / Team: Hospitalist Jose Manuel Mars DO   Reason for consult: GOC discussions. Family wants comfort focused cares but doesn't seem quite ready for hospice.        Recommendations & Counseling     GOALS OF CARE:   Life-prolonging with limits  - family confirmed No CPR- Do NOT Intubate and no invasive procedures, but they would choose to re hospitalize with new problems and are NOT ready to pursue hospice.     ADVANCE CARE PLANNING:  No health care directive on file. Per  informed consent policy, next of kin should be involved if patient becomes unable.  New POLST form completed today.  Indicates family preference for SELECTIVE TREATMENT.  Code status: No CPR- Do NOT Intubate    MEDICAL MANAGEMENT:   #Pain,chronic   Opioids: Patient's opioid use in past 24 hours: None = 0 mg Daily Morphine Equivalent   Acetaminophen (Tylenol), PRN  NSAIDs:   Not recommended due to anemia and metastatic disease  Muscle Relaxer's:Methocarbamol (Robaxin) 500 mg QID PRN  Heat and Exercise (Yoga, Brice Chi, etc)       PSYCHOSOCIAL/SPIRITUAL SUPPORT:  Family Daughter, Yessy Chand cares for her mother at home  [place] community: Undesignated   Appreciate input of  Intern Jimy Hernández    Palliative Care will sign off as patient to dismiss, but outpatient palliative care is recommended for clinic follow up on dismissal. Thank you for the consult and allowing us to aid in the care of Vickie Aquino.    These recommendations have been discussed with Hospitalist Jose Manuel Mars DO and bedside nurse COREY Kwan.    DIMPLE Hurley CNS  [facility], Palliative Care  Securely message with the Vocera Web Console (learn more here) or  Text page via [employer] Paging/Directory         Assessment      Vickie Aquino is a 89 year old female admitted 8/3/2024 from hospice care at her daughter's home due to worsening left  "flank pain with left hip and abdomen bruising.    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      Today, the patient was seen for:  Goals of care and symptom management    History of Present Illness   Met with Vickie as she was resting in bed. Her daughter Yessy at bedside. Yessy acknowledged she would act as her mother's . I introduced our role as an extra layer of support and how we help patients and families dealing with serious, potentially life-limiting illnesses. I explained the composition of the palliative care team.  Palliative care helps patients and families navigate their care while focusing on the whole person; providing emotional, social and spiritual support  Palliative care often assists with symptom management, information sharing about what to expect from the illness, available treatment options and what effect those options may have on the disease course, and provide effective communication and caring support. Yessy acknowledged, \"We didn't know what we were doing when she was here two weeks ago. We were naive and started hospice, but she has good quality of life. She's getting stronger and able to eat what she wants. She's not ready for hospice. This is not the end of her life. Not yet. She needs to get better and come to the hospital if it would help. She's not dying yet.\" Yessy had questions regarding her mother's anemia and use of a diuretic, which I defer to the Hospitalist expertise.     Prognosis, Goals, & Planning:   Functional Status just prior to this current hospitalization:  Outpatient Palliative Performance Score (PPS) 70%  Significant evidence of disease.  Full/normal self-care & LOC; normal or reduced intake, reduced ambulation and " activity/work.      Prognosis, Goals, and/or Advance Care Planning:  We discussed general treatment options (full/restorative, selective/conservatives, and comfort only/hospice). We then discussed how these specifically apply to Vickie's goals of care.  Based on this discussion, her family has decided to decline hospice care and would like follow up at the Palliative Care Clinic (which is consulted)    Code Status was addressed today:   Yes, We discussed potential risks and rationale of attempting cardiac resuscitation, intubation, and mechanical ventilation.  We also discussed probability of survival as well as quality of life implications.  Based on this discussion, patient or surrogate response/decision: No CPR- Do NOT Intubate      Patient's decision making preferences: shared with support from loved ones        Patient has decision-making capacity today for complex decisions: Intact          Coping, Meaning, & Spirituality:   Mood, coping, and/or meaning in the context of serious illness were addressed today: Yes    Social:   Living situation:lives with family her daughter Yessy, retired from her job 2 weeks ago to be able to care for her mother full time    Medications:  Reviewed this patient's medication profile and medications from this hospitalization. Notable medications:Discontinued Mirapex 0.125 mg for restless leg syndrome as it made Vickie sleep for most of the day and ordered Robaxin 500 mg QID PRN    [place] Board of Pharmacy Data Base Reviewed: Yes:   reviewed - Noting Lorazepam and Hydromorphone prescribed 8/1/2024 when the patient enrolled in hospice     ROS:  Comprehensive ROS is reviewed and is negative except as here & per HPI:     Physical Exam   Vital Signs with Ranges  Temp:  [97.8  F (36.6  C)-98.1  F (36.7  C)] 98.1  F (36.7  C)  Pulse:  [58-72] 72  Resp:  [20-22] 22  BP: (138-147)/(43-64) 147/60  SpO2:  [88 %-96 %] 93 %  Wt Readings from Last 10 Encounters:   08/04/24 62.1 kg (136 lb 14.5  oz)   07/21/24 61.1 kg (134 lb 11.2 oz)     136 lbs 14.49 oz    PHYSICAL EXAM:  GEN:  Elderly  female with notable muscle spasms. Alert, oriented x 3 (per daughter's assessment), appears comfortable, no apparent distress.  HEENT:  Normocephalic/atraumatic, no scleral icterus, no nasal discharge, mouth moist.  CV:  RRR, S1, S2; no murmurs or other irregularities noted.  +3 DP/PT pulses bilatererally; no edema BLE.  RESP:  Clear to auscultation bilaterally without rales/rhonchi/wheezing/retractions.  Symmetric chest rise on inhalation noted.  Normal respiratory effort.  ABD:  Rounded, soft, non-tender/non-distended.  +BS  EXT:  CMS intact x 4.     M/S:   Denies pain/discomfort but does have muscle spasms of Left>Right leg.    SKIN:  Warm and dry to touch, no exanthems noted in the visualized areas.    PAIN BEHAVIOR: Cooperative  Psych:  Normal affect.  Calm, cooperative, pleasantly smiling and speaking in Mandarin to her daughter.    Data reviewed:  Results for orders placed or performed during the hospital encounter of 08/03/24   CT Abdomen Pelvis w Contrast     Status: None    Narrative    EXAM: CT ABDOMEN PELVIS W CONTRAST  LOCATION: [facility]  DATE: 8/3/2024    INDICATION: left flank pain and bruising  COMPARISON: CT from 7/21/2024.  TECHNIQUE: CT scan of the abdomen and pelvis was performed following injection of IV contrast. Multiplanar reformats were obtained. Dose reduction techniques were used.  CONTRAST: 68mL Isovue 370    FINDINGS:   LOWER CHEST: Redemonstrated bibasilar pulmonary metastases. Increased bibasilar atelectasis and bilateral pleural effusions. Large paraesophageal varices.    HEPATOBILIARY: Nondistended gallbladder. Cirrhotic liver morphology with an ill-defined hypodense mass in segment 7 measuring approximately 5 cm on image 47 of series 4. Intrahepatic portal vein thrombosis noted previously is less well demonstrated on   this exam due to motion artifact, though  some clot is seen within the main portal vein on image 29 of series 3 and image 42 of series 5 which appears increased compared to prior study.    PANCREAS: Stable 11 mm cystic lesion in the pancreatic head on image 83 series 4.    SPLEEN: Within normal limits for size.    ADRENAL GLANDS: Normal.    KIDNEYS/BLADDER: Lobulated left renal cortical contours. No hydronephrosis. Normal bladder.    BOWEL: The stomach is decompressed. No bowel obstruction or free air. Small to moderate volume ascites, increased compared to prior.    LYMPH NODES: Normal.    VASCULATURE: Aortoiliac atherosclerotic calcification.    PELVIC ORGANS: Ascites.    MUSCULOSKELETAL: Osteopenia with multilevel degenerative disc change. Similar superior endplate defect in the L1 vertebral body. There is some hyperdense expansion of the left lateral abdominal wall musculature, measuring 2.9 cm in thickness on image 106   of series 4 (measuring 1.6 cm at the same level on the contralateral side). There is no evidence for active bleeding.      Impression    IMPRESSION:   1.  Cirrhosis with 5 cm segment 7 liver lesion highly concerning for hepatocellular carcinoma.    2.  Bibasilar pulmonary metastases similar over short interval follow-up. Increasing bibasilar atelectasis and pleural effusions.    3.  Small to moderate volume ascites, increased compared to prior study.    4.  Intramuscular hematoma within the left lateral abdominal wall musculature. No active bleeding demonstrated. No solid organ injury.   US Paracentesis without Albumin     Status: None    Narrative    ULTRASOUND GUIDED PARACENTESIS   8/4/2024 1:47 PM     HISTORY:  malignant ascites    PROCEDURE:   Informed consent was obtained from the patient prior to  the procedure with discussion including the possible risks of  bleeding, infection and organ injury . Using 5 mL of 1% lidocaine for  local anesthesia, sterile technique, and sonographic guidance with  permanent image documentation, I  placed an 8F paracentesis catheter  into the peritoneal fluid collection. This was used to aspirate 300 mL  of yellow, serous fluid in vacuum bottles, and some of this was sent  for any laboratory studies that had been ordered. There were no  immediate complications.       Impression    IMPRESSION:  Ultrasound guided paracentesis.    SULEMAN BETANCOURT MD         SYSTEM ID:  [id number]   US Thoracentesis Bilateral     Status: None (Preliminary result)    Narrative    ULTRASOUND THORACENTESIS BILATERAL  August 4, 2024 1:49 PM     HISTORY: Bilateral pleural effusions.    COMPARISON: None.    FINDINGS: After obtaining informed consent, the patient was placed in  a supine position on the ultrasound table. The lateral thoraces were  prepped and draped in the usual sterile manner. 1% lidocaine was  injected for local anesthesia. Under ultrasound guidance, using 5  British needle catheter systems, access into the pleural spaces was  obtained bilaterally. Thoracenteses were performed bilaterally.  Approximately 450 cc matt-colored fluid was removed from the left  pleural space and 300 cc matt-colored fluid was removed from the left  pleural space.      Impression    IMPRESSION: Bilateral ultrasound-guided thoracentesis performed as  above.   INR     Status: Abnormal   Result Value Ref Range    INR 1.21 (H) 0.85 - 1.15   Partial thromboplastin time     Status: Normal   Result Value Ref Range    aPTT 32 22 - 38 Seconds   Comprehensive metabolic panel     Status: Abnormal   Result Value Ref Range    Sodium 127 (L) 135 - 145 mmol/L    Potassium 4.7 3.4 - 5.3 mmol/L    Carbon Dioxide (CO2) 19 (L) 22 - 29 mmol/L    Anion Gap 13 7 - 15 mmol/L    Urea Nitrogen 32.4 (H) 8.0 - 23.0 mg/dL    Creatinine 0.85 0.51 - 0.95 mg/dL    GFR Estimate 62 >60 mL/min/1.73m2    Calcium 9.4 8.8 - 10.4 mg/dL    Chloride 95 (L) 98 - 107 mmol/L    Glucose 124 (H) 70 - 99 mg/dL    Alkaline Phosphatase 203 (H) 40 - 150 U/L     (H) 0 - 45 U/L    ALT  82 (H) 0 - 50 U/L    Protein Total 6.5 6.4 - 8.3 g/dL    Albumin 3.4 (L) 3.5 - 5.2 g/dL    Bilirubin Total 3.6 (H) <=1.2 mg/dL   CBC with platelets and differential     Status: Abnormal   Result Value Ref Range    WBC Count 9.4 4.0 - 11.0 10e3/uL    RBC Count 3.83 3.80 - 5.20 10e6/uL    Hemoglobin 11.5 (L) 11.7 - 15.7 g/dL    Hematocrit 36.1 35.0 - 47.0 %    MCV 94 78 - 100 fL    MCH 30.0 26.5 - 33.0 pg    MCHC 31.9 31.5 - 36.5 g/dL    RDW 15.9 (H) 10.0 - 15.0 %    Platelet Count 197 150 - 450 10e3/uL    % Neutrophils 75 %    % Lymphocytes 8 %    % Monocytes 14 %    % Eosinophils 0 %    % Basophils 0 %    % Immature Granulocytes 2 %    NRBCs per 100 WBC 0 <1 /100    Absolute Neutrophils 7.1 1.6 - 8.3 10e3/uL    Absolute Lymphocytes 0.8 0.8 - 5.3 10e3/uL    Absolute Monocytes 1.4 (H) 0.0 - 1.3 10e3/uL    Absolute Eosinophils 0.0 0.0 - 0.7 10e3/uL    Absolute Basophils 0.0 0.0 - 0.2 10e3/uL    Absolute Immature Granulocytes 0.2 <=0.4 10e3/uL    Absolute NRBCs 0.0 10e3/uL   Iron and iron binding capacity     Status: Abnormal   Result Value Ref Range    Iron 49 37 - 145 ug/dL    Iron Binding Capacity 204 (L) 240 - 430 ug/dL    Iron Sat Index 24 15 - 46 %   Basic metabolic panel     Status: Abnormal   Result Value Ref Range    Sodium 128 (L) 135 - 145 mmol/L    Potassium 4.7 3.4 - 5.3 mmol/L    Chloride 96 (L) 98 - 107 mmol/L    Carbon Dioxide (CO2) 19 (L) 22 - 29 mmol/L    Anion Gap 13 7 - 15 mmol/L    Urea Nitrogen 32.0 (H) 8.0 - 23.0 mg/dL    Creatinine 0.95 0.51 - 0.95 mg/dL    GFR Estimate 54 (L) >60 mL/min/1.73m2    Calcium 9.0 8.8 - 10.4 mg/dL    Glucose 109 (H) 70 - 99 mg/dL   CBC with platelets     Status: Abnormal   Result Value Ref Range    WBC Count 8.5 4.0 - 11.0 10e3/uL    RBC Count 3.61 (L) 3.80 - 5.20 10e6/uL    Hemoglobin 11.1 (L) 11.7 - 15.7 g/dL    Hematocrit 34.3 (L) 35.0 - 47.0 %    MCV 95 78 - 100 fL    MCH 30.7 26.5 - 33.0 pg    MCHC 32.4 31.5 - 36.5 g/dL    RDW 16.1 (H) 10.0 - 15.0 %    Platelet  Count 183 150 - 450 10e3/uL   Sodium random urine     Status: None   Result Value Ref Range    Sodium Urine mmol/L 20 mmol/L   Osmolality urine     Status: Normal   Result Value Ref Range    Osmolality Urine 414 100 - 1,200 mmol/kg    Narrative    Reference Ranges depend on patient's hydration status and renal function.   Neonates:  mmol/kg   2 years and older, random specimens: 100-1200 mmol/kg; Greater than 850 mmol/kg after 12 hour fluid restriction  Urine/serum osmolality ratio: 2 years and older: 1.0-3.0; 3.0-4.7 after 12 hour fluid restriction   Osmolality     Status: Normal   Result Value Ref Range    Osmolality Blood 285 280 - 301 mmol/kg    Narrative    Greater than 385 mmol/kg relates to stupor in hyperglycemia   Greater than 400 mmol/kg can relate to seizures   Greater than 420 mmol/kg can be lethal    Serum Osmalar Gap:   Normal <10   Larger suggest unmeasured substances present in serum (ethanol, methanol, isopropanol, mannitol, ethylene glycol).   Basic metabolic panel     Status: Abnormal   Result Value Ref Range    Sodium 128 (L) 135 - 145 mmol/L    Potassium 4.9 3.4 - 5.3 mmol/L    Chloride 95 (L) 98 - 107 mmol/L    Carbon Dioxide (CO2) 20 (L) 22 - 29 mmol/L    Anion Gap 13 7 - 15 mmol/L    Urea Nitrogen 30.2 (H) 8.0 - 23.0 mg/dL    Creatinine 1.00 (H) 0.51 - 0.95 mg/dL    GFR Estimate 51 (L) >60 mL/min/1.73m2    Calcium 9.8 8.8 - 10.4 mg/dL    Glucose 96 70 - 99 mg/dL   CBC with platelets differential     Status: Abnormal    Narrative    The following orders were created for panel order CBC with platelets differential.  Procedure                               Abnormality         Status                     ---------                               -----------         ------                     CBC with platelets and d...[[id number]]  Abnormal            Final result                 Please view results for these tests on the individual orders.         Medical Decision Making       MANAGEMENT  DISCUSSED with the following over the past 24 hours: Hospitalist Jose Manuel Mars DO and bedside nurse COREY Kwan.   4 PM until 5:20 PM - 80  MINUTES SPENT BY ME on the date of service doing chart review, history, exam, documentation & further activities per the note.

## 2024-08-05 NOTE — PLAN OF CARE
"Goal Outcome Evaluation:  Pertinent assessments: Mandarin Speaking. A&Ox4 daughter interpretation. VSS. On RA. SBA with gait belt. Son & daughter at bedside throughout the shift & helping w/ cares. Denies pain or discomfort. No IV access. MD aware.      Major Shift Events: Palliative consulted, home O2 Oxygen, Discharging this evening when seen by palliative      Treatment Plan: Symptom management. Wean O2, onc consult, monitor labs    Plan of Care Reviewed With: patient  Overall Patient Progress: improvingOverall Patient Progress: improving  Outcome Evaluation: VSS. Wean off O2. On RA now. Passed Home Oxygen    Problem: Adult Inpatient Plan of Care  Goal: Plan of Care Review  Description: The Plan of Care Review/Shift note should be completed every shift.  The Outcome Evaluation is a brief statement about your assessment that the patient is improving, declining, or no change.  This information will be displayed automatically on your shift  note.  Outcome: Progressing  Flowsheets (Taken 8/5/2024 1342)  Outcome Evaluation: VSS. Wean off O2. On RA now. Passed Home Oxygen  Plan of Care Reviewed With: patient  Overall Patient Progress: improving  Goal: Patient-Specific Goal (Individualized)  Description: You can add care plan individualizations to a care plan. Examples of Individualization might be:  \"Parent requests to be called daily at 9am for status\", \"I have a hard time hearing out of my right ear\", or \"Do not touch me to wake me up as it startles  me\".  Outcome: Progressing  Goal: Absence of Hospital-Acquired Illness or Injury  Outcome: Progressing  Intervention: Identify and Manage Fall Risk  Recent Flowsheet Documentation  Taken 8/5/2024 0966 by Kiersten Paiz, RN  Safety Promotion/Fall Prevention:   activity supervised   nonskid shoes/slippers when out of bed   patient and family education   room door open   room near nurse's station   safety round/check completed  Intervention: Prevent Skin Injury  Recent " Flowsheet Documentation  Taken 8/5/2024 0950 by Kiersten Paiz RN  Body Position: position changed independently  Intervention: Prevent and Manage VTE (Venous Thromboembolism) Risk  Recent Flowsheet Documentation  Taken 8/5/2024 0950 by Kiersten Paiz RN  VTE Prevention/Management: SCDs off (sequential compression devices)  Intervention: Prevent Infection  Recent Flowsheet Documentation  Taken 8/5/2024 0950 by Kiersten Paiz RN  Infection Prevention:   cohorting utilized   hand hygiene promoted   rest/sleep promoted  Goal: Optimal Comfort and Wellbeing  Outcome: Progressing  Goal: Readiness for Transition of Care  Outcome: Progressing     Problem: Skin Injury Risk Increased  Goal: Skin Health and Integrity  Outcome: Progressing  Intervention: Plan: Nurse Driven Intervention: Moisture Management  Recent Flowsheet Documentation  Taken 8/5/2024 0950 by Kiersten Pazi RN  Moisture Interventions: Encourage regular toileting  Taken 8/5/2024 0857 by Kiersten Paiz RN  Moisture Interventions: Encourage regular toileting  Bathing/Skin Care: other (see comments)  Intervention: Optimize Skin Protection  Recent Flowsheet Documentation  Taken 8/5/2024 0950 by Kiersten Paiz RN  Activity Management:   activity adjusted per tolerance   up ad jose l  Head of Bed (HOB) Positioning: HOB at 20-30 degrees

## 2024-08-05 NOTE — PLAN OF CARE
"      PRIMARY DIAGNOSIS: \"GENERIC\" NURSING  OUTPATIENT/OBSERVATION GOALS TO BE MET BEFORE DISCHARGE:  ADLs back to baseline: No    Activity and level of assistance: Up with standby assistance.    Pain status: Pain free.    Return to near baseline physical activity: No     Discharge Planner Nurse   Safe discharge environment identified: Yes  Barriers to discharge: Yes       Entered by: SCAR ESCOBEDO RN 08/04/2024 10:31 PM     Please review provider order for any additional goals.   Nurse to notify provider when observation goals have been met and patient is ready for discharge.Pertinent assessments: Pt A&Ox4, mandarin speaking, family interpreting and assisting with cares. VSS on 1.5L NC satting low 90s, when off drops to 87/88%.  Denies pain, nausea. Some BOWDEN with ambulation in the room, denies SOB at rest. A1 by family up in room and to BR. Abd distended. Extensive brusing on left side of abdomen/hip/back/pelvis. PIV painful, removed and ok per MD Mars to leave out for now. Pt very sleepy/lethargic throughout shift, per family they believe this is from PRN mirapex given earlier in the day.       Major Shift Events: lethargy    Treatment Plan: Symptom management. Wean O2, onc consult, monitor labs    Bedside Nurse: SCAR ESCOBEDO RN           Goal Outcome Evaluation:      Plan of Care Reviewed With: patient    Overall Patient Progress: no changeOverall Patient Progress: no change    Outcome Evaluation: sats low 90s on 1.5L NC, drops to upper 80s on RA, lethargic after mirapex this afternoon      Problem: Adult Inpatient Plan of Care  Goal: Plan of Care Review  Description: The Plan of Care Review/Shift note should be completed every shift.  The Outcome Evaluation is a brief statement about your assessment that the patient is improving, declining, or no change.  This information will be displayed automatically on your shift  note.  Outcome: Progressing  Flowsheets (Taken 8/4/2024 2230)  Outcome Evaluation: sats " "low 90s on 1.5L NC, drops to upper 80s on RA, lethargic after mirapex this afternoon  Plan of Care Reviewed With: patient  Overall Patient Progress: no change  Goal: Patient-Specific Goal (Individualized)  Description: You can add care plan individualizations to a care plan. Examples of Individualization might be:  \"Parent requests to be called daily at 9am for status\", \"I have a hard time hearing out of my right ear\", or \"Do not touch me to wake me up as it startles  me\".  Outcome: Progressing  Goal: Absence of Hospital-Acquired Illness or Injury  Outcome: Progressing  Intervention: Identify and Manage Fall Risk  Recent Flowsheet Documentation  Taken 8/4/2024 1700 by Arely Flores RN  Safety Promotion/Fall Prevention:   activity supervised   nonskid shoes/slippers when out of bed   patient and family education   room door open   room near nurse's station   safety round/check completed  Intervention: Prevent Skin Injury  Recent Flowsheet Documentation  Taken 8/4/2024 2014 by Arely Flores RN  Body Position: position changed independently  Taken 8/4/2024 1700 by Arely Flores RN  Body Position: position changed independently  Intervention: Prevent and Manage VTE (Venous Thromboembolism) Risk  Recent Flowsheet Documentation  Taken 8/4/2024 1700 by Arely Flores RN  VTE Prevention/Management: SCDs off (sequential compression devices)  Intervention: Prevent Infection  Recent Flowsheet Documentation  Taken 8/4/2024 1700 by Arely Flores RN  Infection Prevention:   cohorting utilized   hand hygiene promoted   rest/sleep promoted  Goal: Optimal Comfort and Wellbeing  Outcome: Progressing  Goal: Readiness for Transition of Care  Outcome: Progressing     Problem: Skin Injury Risk Increased  Goal: Skin Health and Integrity  Outcome: Progressing  Intervention: Plan: Nurse Driven Intervention: Moisture Management  Recent Flowsheet Documentation  Taken 8/4/2024 2000 by Arely Flores RN  Moisture Interventions: " Encourage regular toileting  Bathing/Skin Care: linen changed  Intervention: Optimize Skin Protection  Recent Flowsheet Documentation  Taken 8/4/2024 2014 by Arely Flores, RN  Activity Management: activity adjusted per tolerance  Taken 8/4/2024 1700 by Arely Flores, RN  Activity Management: (with family)   activity adjusted per tolerance   up ad jose l  Head of Bed (HOB) Positioning: HOB at 20-30 degrees

## 2024-08-05 NOTE — DISCHARGE SUMMARY
"[facility]  Hospitalist Discharge Summary      Date of Admission:  8/3/2024  Date of Discharge:  8/5/2024  6:41 PM  Discharging Provider: Jose Manuel Mars DO  Discharge Service: Hospitalist Service    Discharge Diagnoses    Vickie Aquino is a 89 year old female with a history of  htn, HCV, cirrhosis, recently found to have suspected HCC with mets to the lung, CKD 3 with baseline creat 0.7-0.9, depression ,currently DNR/I-enrolled in hospice admitted on 8/3/2024 with new bruising along left hip/abdomen, recurrent ascites, pleural effusions with mild hypoxia, and concern for progressive restless leg type symptoms.     The patient underwent ultrasound guided thoracentesis and paracentesis with improvement in effusion and hypoxia.  She did not require oxygen supplementation on discharge.     While inpatient, the patient and her family med with oncology & palliative care - no plan for chemo.  \"Comfort focused\" treatments but are not yet ready for hospice.     On discharge, she has been referred to palliative care (for ongoing GOC discussion) and IR (for intermittent paracentesis as needed).       Hypoxia  Ascites/bilateral pleural effusions, resolved:  Mildly symptomatic.  At her prior hospitalization sats looked to be in the mid 90's consistently. Pleural effusions present on CT at last hospitalization but appear to have enlarged.  I believe that her ascites/effusions are cirrhosis related rather than being malignant in etiology.  Received thoracentesis and paracentesis with resolution of respiratory failure.  Will continue spironolactone and add lasix on discharge.  IR referral for outpatient paracentesis as needed.     RLS  She looks to have restless legs and is frequently having jumping legs during my interview and exam.  Patient received pramipexole but this made her quite lethargic.  Trial prn robaxin on discharge.      Intramuscular abdominal wall hematoma  Hematoma around left hip  Both patient " and family deny any fall/trauma.  Her INR is minimally elevated at 1.27 and her platelet count is normal at 200.  Suspect easy bruising related to liver disease and reduced factor production.  Stable while inpatient.  Close monitoring as outpatient.      Hyponatremia  NAGMA  Prior Na 133 2 weeks ago and has dropped to 127.  Presumably due to cirrhosis.  NAGMA with CO2 19.  Sodium could be in part related to escitalopram.  Stable on discharge.  Trial lasix.  Repeat BMP on Friday.      Cirrhosis with ascites/esophageal varices, portal vein thrombosis noted on CT last admission  Hx of HCV  Suspected metastatic HCC   Portal vein thrombosis (PVT  Cont pta regimen of spironolactone  Difficult to anticoagulate in setting of spontaneous hematoma.  I had not noticed this at the time of admission and did not get a chance to discuss it with family members. Likely combination of clot/tumor.  She has e/o varices on CT scan and now with a spontaneous hematoma so not sure if AC would be most appropriate for her.  Reviewed UTD for PVT and PVT in association with HCC and guidance was not really given.  Would curbside [facility] when able to discuss whether AC appropriate especially with rising LFTs over the past 2 weeks and increasing PVT  -cont with pta spironolactone and add lasix after discussion with GI     Hypertension   Resumed pta amlodipine 10 mg q pm     Depression   Resumed pta escitalopram.       CKD   GFRs in the low 60's       Clinically Significant Risk Factors          Follow-ups Needed After Discharge   Follow-up Appointments     Follow-up and recommended labs and tests       Follow up with primary care provider, Caroline Fernandez or any provider,   within 7 days for hospital follow- up.  The following labs/tests are   recommended: bmp.            Unresulted Labs Ordered in the Past 30 Days of this Admission       No orders found from 7/4/2024 to 8/4/2024.        These results will be followed up by NA    Discharge  Disposition   Discharged to home  Condition at discharge: Stable      Consultations This Hospital Stay   HEMATOLOGY & ONCOLOGY IP CONSULT  SPIRITUAL HEALTH SERVICES IP CONSULT  PALLIATIVE CARE ADULT IP CONSULT    Code Status   No CPR- Do NOT Intubate    Time Spent on this Encounter   I, Jose Manuel Mars DO, personally saw the patient today and spent greater than 30 minutes discharging this patient.       Jose Manuel Mars DO  [place] [address] MEDICAL SURGICAL  [address] [facility] [address]  Phone: [phone]  Fax: [phone]  ______________________________________________________________________    Physical Exam   Vital Signs: Temp: 98.1  F (36.7  C) Temp src: Oral BP: (!) 147/60 Pulse: 72   Resp: 22 SpO2: 93 % O2 Device: Nasal cannula Oxygen Delivery: 1.5 LPM  Weight: 136 lbs 14.49 oz  General Appearance: Patient awake & alert.  No apparent distress.  Appears younger than stated age.  Pleasant.   Respiratory: Lungs with mild rales in the bases.  Work of breathing appears normal on room air.  Cardiovascular: Regular rate and rhythm.  No murmurs rubs or gallops.  There is no edema present.  GI: Benign.  Soft.  Non-tender.  Bowel sounds active.   Skin: No rashes or lesions exposed skin.  Neuro:  The patient is moving all extremities.  No obvious focal asymmetries.   Other: Patient is appropriate.       Primary Care Physician   Caroline Fernandez    Discharge Orders      IR Referral     Basic metabolic panel     Internal Medicine Referral      Adult Palliative Care  Referral      Reason for your hospital stay    You were hospitalized for a hematoma (blood collection under the skin).  This is likely related to your underlying liver disease.  It should improve on its own.  You were also treated for respiratory failure with a thoracentesis and paracentesis for fluid removal.  We have initiated lasix to keep extra fluid off the body.     Follow-up and recommended labs and tests     Follow  up with primary care provider, Caroline Fernandez or any provider, within 7 days for hospital follow- up.  The following labs/tests are recommended: bmp.     Activity    Your activity upon discharge: activity as tolerated     Diet    Follow this diet upon discharge: Low sodium diet       Significant Results and Procedures   Most Recent 3 CBC's:  Recent Labs   Lab Test 08/04/24  0746 08/03/24 2032 07/21/24  1733   WBC 8.5 9.4 6.2   HGB 11.1* 11.5* 11.9   MCV 95 94 94    197 183     Most Recent 3 BMP's:  Recent Labs   Lab Test 08/05/24  0744 08/04/24  0746 08/03/24 2032   * 128* 127*   POTASSIUM 4.9 4.7 4.7   CHLORIDE 95* 96* 95*   CO2 20* 19* 19*   BUN 30.2* 32.0* 32.4*   CR 1.00* 0.95 0.85   ANIONGAP 13 13 13   VIMAL 9.8 9.0 9.4   GLC 96 109* 124*       Discharge Medications   Current Discharge Medication List        START taking these medications    Details   furosemide (LASIX) 20 MG tablet Take 1 tablet (20 mg) by mouth daily  Qty: 30 tablet, Refills: 0    Associated Diagnoses: Cirrhosis of liver with ascites, unspecified hepatic cirrhosis type (H)           CONTINUE these medications which have NOT CHANGED    Details   amLODIPine (NORVASC) 10 MG tablet Take 10 mg by mouth every evening      escitalopram (LEXAPRO) 10 MG tablet Take 20 mg by mouth daily      spironolactone (ALDACTONE) 25 MG tablet Take 1 tablet (25 mg) by mouth daily for 30 days  Qty: 30 tablet, Refills: 0    Associated Diagnoses: Other ascites           Allergies   No Active Allergies

## 2024-08-05 NOTE — PROGRESS NOTES
08/05/24 1042   Home Oxygen Assessment (RN/RT ONLY)   Does patient have oxygen at home? No   1. SpO2 on room air at rest while awake 92-94%   2. SpO2 while at rest on oxygen 96%       Oxygen LPM at rest 2L   3. SpO2 on room air during Activity/with exercise 89-92%   4. SpO2 with oxygen during activity/with exercise 95%       Oxygen LPM during activity/with exercise 2L   Does patient qualify for Home O2? No

## 2024-08-05 NOTE — CONSULTS
SPIRITUAL HEALTH SERVICES - Consult Note  RH MS 5    Referral Source: [facility] Consult    I had a conversation with pt Vickie, her son, and her daughter, her daughter Yessy mason.  She named her children, grandchildren, neighbors, and extended family as sources of support and strength.  She does not have a kerry community she wants us to contact, and she named no spiritual needs at this time.  Oriented her to [facility] services as well.    Plan: I and other chaplains remain available on request.    Jimy Hernández    Intern     [facility] routine referrals?*[phone]  [facility] available 24/7 for emergent requests/referrals, either by paging the on-call  or by entering an ASAP/STAT consult in Epic (this will also page the on-call ).

## 2024-08-05 NOTE — PLAN OF CARE
"PRIMARY DIAGNOSIS: Malignant Ascites  OUTPATIENT/OBSERVATION GOALS TO BE MET BEFORE DISCHARGE:  ADLs back to baseline: Yes    Activity and level of assistance: A1    Pain status: Pain free    Return to near baseline physical activity: Yes     Discharge Planner Nurse   Safe discharge environment identified: Yes  Barriers to discharge: Yes       Entered by: Keiko Turpin RN 08/05/2024    Sleepy/lethargic this shift. VSS, on 1.5 L NC. Up w/ A1. Mandarin speaking. Son at bedside-- helping w/ cares. Denies pain or discomfort. No IV access. MD aware. Hem/Onc consult.   Please review provider order for any additional goals.   Nurse to notify provider when observation goals have been met and patient is ready for discharge.  Goal Outcome Evaluation:      Plan of Care Reviewed With: patient, child    Overall Patient Progress: no changeOverall Patient Progress: no change    Outcome Evaluation: Pedro 1.5 L NC. Denies pain or discomfort. Son at bedside.      Problem: Adult Inpatient Plan of Care  Goal: Plan of Care Review  Description: The Plan of Care Review/Shift note should be completed every shift.  The Outcome Evaluation is a brief statement about your assessment that the patient is improving, declining, or no change.  This information will be displayed automatically on your shift  note.  Outcome: Progressing  Flowsheets (Taken 8/5/2024 0153)  Outcome Evaluation: Pedro 1.5 L NC. Denies pain or discomfort. Son at bedside.  Plan of Care Reviewed With:   patient   child  Overall Patient Progress: no change  Goal: Patient-Specific Goal (Individualized)  Description: You can add care plan individualizations to a care plan. Examples of Individualization might be:  \"Parent requests to be called daily at 9am for status\", \"I have a hard time hearing out of my right ear\", or \"Do not touch me to wake me up as it startles  me\".  Outcome: Progressing  Goal: Absence of Hospital-Acquired Illness or Injury  Outcome: " Progressing  Intervention: Identify and Manage Fall Risk  Recent Flowsheet Documentation  Taken 8/5/2024 0050 by Keiko Gaona, RN  Safety Promotion/Fall Prevention: (family at bedside as well)   activity supervised   clutter free environment maintained   increase visualization of patient   nonskid shoes/slippers when out of bed   patient and family education   room near nurse's station   safety round/check completed  Intervention: Prevent Infection  Recent Flowsheet Documentation  Taken 8/5/2024 0050 by Keiko Gaona, RN  Infection Prevention:   rest/sleep promoted   single patient room provided   hand hygiene promoted  Goal: Optimal Comfort and Wellbeing  Outcome: Progressing  Goal: Readiness for Transition of Care  Outcome: Progressing

## 2024-08-05 NOTE — CONSULTS
Care Management Initial Consult    General Information  Assessment completed with: Patient, Children, Vickie and Yessy  Type of CM/SW Visit: Initial Assessment    Primary Care Provider verified and updated as needed: Yes   Readmission within the last 30 days:        Reason for Consult: discharge planning  Advance Care Planning: Advance Care Planning Reviewed: present on chart          Communication Assessment  Patient's communication style: spoken language (non-English)    Hearing Difficulty or Deaf: yes   Wear Glasses or Blind: yes    Cognitive  Cognitive/Neuro/Behavioral: WDL                      Living Environment:   People in home: child(charles), adult  Yessy  Current living Arrangements: house      Able to return to prior arrangements: yes       Family/Social Support:  Care provided by: child(charles), self  Provides care for: no one  Marital Status:   Children          Description of Support System: Supportive, Involved    Support Assessment: Adequate family and caregiver support, Adequate social supports    Current Resources:   Patient receiving home care services: No     Community Resources: Hospice  Equipment currently used at home: cane, straight, walker, standard, wheelchair, manual  Supplies currently used at home: Incontinence Supplies, Gloves    Employment/Financial:  Employment Status: retired        Financial Concerns: none   Referral to Financial Worker: No       Does the patient's insurance plan have a 3 day qualifying hospital stay waiver?  No    Lifestyle & Psychosocial Needs:  Social Determinants of Health     Food Insecurity: No Food Insecurity (2/28/2024)    Received from [facility] [employer]    Food Insecurity     Worried About Running Out of Food in the Last Year: 1   Depression: At risk (2/15/2024)    Received from [facility]    PHQ-2     PHQ-2 TOTAL SCORE: 4   Housing Stability: Low Risk  (2/28/2024)    Received from [facility]   [facility]    Housing Stability     Unable to Pay for Housing in the Last Year: 1   Tobacco Use: Unknown (8/4/2024)    Patient History     Smoking Tobacco Use: Never     Smokeless Tobacco Use: Unknown     Passive Exposure: Not on file   Financial Resource Strain: Low Risk  (2/28/2024)    [facility]    Financial Resource Strain     Difficulty of Paying Living Expenses: 3     Difficulty of Paying Living Expenses: Not on file   Alcohol Use: Not on file   Transportation Needs: No Transportation Needs (2/28/2024)    [facility]    Transportation Needs     Lack of Transportation (Medical): 1   Physical Activity: Not on file   Interpersonal Safety: Not on file   Stress: Not on file   Social Connections: Socially Integrated (2/28/2024)    [facility]    Social Connections     Frequency of Communication with Friends and Family: 0   Health Literacy: Not on file       Functional Status:  Prior to admission patient needed assistance:   Dependent ADLs:: Independent  Dependent IADLs:: Independent       Mental Health Status:  Mental Health Status: No Current Concerns       Chemical Dependency Status:  Chemical Dependency Status: No Current Concerns             Values/Beliefs:  Spiritual, Cultural Beliefs, Yazidi Practices, Values that affect care:            Values/Beliefs Comment: Undesignated      Care Management Discharge Note    Discharge Date: 08/06/2024       Discharge Disposition: Home, Palliative Care    Discharge Services: None    Discharge DME: None    Discharge Transportation: family or friend will provide - Pt's dtr Yessy    Private pay costs discussed: Not applicable    Does the patient's insurance plan have a 3 day qualifying hospital stay waiver?  No    PAS Confirmation Code: N/A  Patient/family educated on Medicare website which has current facility and service  quality ratings:  (N/A)    Education Provided on the Discharge Plan: Yes  Persons Notified of Discharge Plans: Pt, Pt's dtr Yessy  Patient/Family in Agreement with the Plan: yes    Handoff Referral Completed: No    Additional Information:  Evelyn met with the pt and her dtr Yessy and son.  The pt lives with her dtr Yessy and is independent.  Pt discharged from [facility] on 7/22/24.  She discharged home with Yessy and signed on with [facility].  Evelyn spoke with Crys from [facility] who said that the pt discharged from their services and admitted to [facility].    Yessy said that they signed paperwork last night with [facility] to discharge from their services.  She said that they are not wanting to take the fully comfort approach at this time.  The pt switched from [facility] to [facility], so the pt could continue getting bryan while on hospice.  Yessy said that the pt has been doing well.  They will work on getting the pt a walker and wheelchair because currently that is being supplied by hospice.  They plan to follow with OP Palliative and transition care to the [facility].    Sw addressed their questions and concerns.    There are no additional Sw needs identified at this time.  Please update Sw if discharge needs arise.      ISAÍAS Diehl, [facility]  Inpatient Care Coordination  [facility]  [phone]

## 2024-08-05 NOTE — PLAN OF CARE
"PRIMARY DIAGNOSIS: Malignant Ascites  OUTPATIENT/OBSERVATION GOALS TO BE MET BEFORE DISCHARGE:  ADLs back to baseline: Yes    Activity and level of assistance: A1    Pain status: Pain free    Return to near baseline physical activity: Yes     Discharge Planner Nurse   Safe discharge environment identified: Yes  Barriers to discharge: Yes       Entered by: Keiko Turpin RN 08/05/2024    Sleepy/lethargic this shift. VSS, on 1.5 L NC. Up w/ A1. Mandarin speaking. Son at bedside-- helping w/ cares. Getting up to BR to void. Denies pain or discomfort. No IV access. MD aware. Hem/Onc consult.   Please review provider order for any additional goals.   Nurse to notify provider when observation goals have been met and patient is ready for discharge.  Goal Outcome Evaluation:      Plan of Care Reviewed With: patient, child    Overall Patient Progress: no changeOverall Patient Progress: no change    Outcome Evaluation: Pedro 1.5 L NC. Denies pain or discomfort. Son at bedside.      Problem: Adult Inpatient Plan of Care  Goal: Plan of Care Review  Description: The Plan of Care Review/Shift note should be completed every shift.  The Outcome Evaluation is a brief statement about your assessment that the patient is improving, declining, or no change.  This information will be displayed automatically on your shift  note.  Outcome: Progressing  Flowsheets (Taken 8/5/2024 0153)  Outcome Evaluation: Pedro 1.5 L NC. Denies pain or discomfort. Son at bedside.  Plan of Care Reviewed With:   patient   child  Overall Patient Progress: no change  Goal: Patient-Specific Goal (Individualized)  Description: You can add care plan individualizations to a care plan. Examples of Individualization might be:  \"Parent requests to be called daily at 9am for status\", \"I have a hard time hearing out of my right ear\", or \"Do not touch me to wake me up as it startles  me\".  Outcome: Progressing  Goal: Absence of Hospital-Acquired Illness or " Injury  Outcome: Progressing  Intervention: Identify and Manage Fall Risk  Recent Flowsheet Documentation  Taken 8/5/2024 0050 by Keiko Gaona, RN  Safety Promotion/Fall Prevention: (family at bedside as well)   activity supervised   clutter free environment maintained   increase visualization of patient   nonskid shoes/slippers when out of bed   patient and family education   room near nurse's station   safety round/check completed  Intervention: Prevent Infection  Recent Flowsheet Documentation  Taken 8/5/2024 0050 by Keiko Gaona, RN  Infection Prevention:   rest/sleep promoted   single patient room provided   hand hygiene promoted  Goal: Optimal Comfort and Wellbeing  Outcome: Progressing  Goal: Readiness for Transition of Care  Outcome: Progressing

## 2024-08-05 NOTE — CONSULTS
[facility]  Consult Note - MNO  Date of Admission:  8/3/2024  Consult Requested by: Dr. Mars  Reason for Consult: HCC with mets    Assessment & Plan   Vickie Aquino is a 89 year old female admitted on 8/3/2024.     1.HCC with lung metastases  - Discussed with patient and family they do not want aggressive treatment  - Wishes to focus on quality of life  - All in agreement with no aggressive treatment but then not certain they are ready for hospice yet    PLAN:  - Discussed with social work and palliative care but other disposition options, with comfort based focus.    2.Ascites secondary to cirrhosis  - Daughter does not feel she is ready to place a catheter at this point    3. Intramuscular hematoma  - INR mildly elevated  - Unclear if related to cirrhosis    4.Paracentesis  - Performed, ascites and pleural effusion  - Secondary to cirrhosis and malignancy    DNR/ DNI    D/w Dr. Mars  Clinically Significant Risk Factors Present on Admission         # Hyponatremia: Lowest Na = 127 mmol/L in last 2 days, will monitor as appropriate      # Hypoalbuminemia: Lowest albumin = 3.4 g/dL at 8/3/2024  8:32 PM, will monitor as appropriate  # Coagulation Defect: INR = 1.21 (Ref range: 0.85 - 1.15) and/or PTT = 32 Seconds (Ref range: 22 - 38 Seconds), will monitor for bleeding                # Financial/Environmental Concerns: none         Boyd Haque MD  Hospitalist Service  Securely message with [employer] (more info)  Text page via [employer] Paging/Directory   ______________________________________________________________________    Chief Complaint    brusing        History of Present Illness   Vickie Aquino is a 89 year old female whoWas admitted for bruising.  Of note she was followed by [facility] for cirrhosis on Noctiva liver lesion at least 2 years ago family decided that point not to do further workup and they are actually forgotten about it.  - 7/21/2022 CT scan of showed multiple bilateral pulm  nodules likely consistent with metastatic disease  - Cirrhosis evidence of portal hypertension moderate ascites  - Hepatic mass suspicious for infiltrative hepatic cellular carcinoma  - 8/4/2024: Brought to the ER due to enlarging bruise and slight increase abdominal distention  - 8/4/2024: CT scan images personally reviewed agree with assessment shows cirrhosis with 5 cm segment 7 liver lesion concerning for HCC bilateral pulmonary mets small ascites      Past Medical History    Past Medical History:   Diagnosis Date    Benign essential hypertension     Cirrhosis of liver (H)     Depression     Hepatitis C     Malignant neoplasm of liver, primary (H)     presumed HCC and metastatic       Past Surgical History   No past surgical history on file.    Medications   Current Facility-Administered Medications   Medication Dose Route Frequency Provider Last Rate Last Admin    acetaminophen (TYLENOL) tablet 650 mg  650 mg Oral Q4H PRN Lizzeth Galaviz MD        Or    acetaminophen (TYLENOL) Suppository 650 mg  650 mg Rectal Q4H PRN Lizzeth Galaviz MD        amLODIPine (NORVASC) tablet 10 mg  10 mg Oral QPM Lizzeth Galaviz MD   10 mg at 08/04/24 2014    bisacodyl (DULCOLAX) suppository 10 mg  10 mg Rectal Daily PRN Jose Manuel Mars DO   10 mg at 08/05/24 1657    escitalopram (LEXAPRO) tablet 20 mg  20 mg Oral Daily Seamus Winslow, DO   20 mg at 08/05/24 0942    melatonin tablet 3 mg  3 mg Oral At Bedtime PRN Lizzeth Galaviz MD        methocarbamol (ROBAXIN) tablet 500 mg  500 mg Oral 4x Daily Mica Aviles, APRN CNS        ondansetron (ZOFRAN ODT) ODT tab 4 mg  4 mg Oral Q6H PRN Lizzeth Galaviz MD        Or    ondansetron (ZOFRAN) injection 4 mg  4 mg Intravenous Q6H PRN Lizzeth Galaviz MD        polyethylene glycol (MIRALAX) Packet 17 g  17 g Oral Daily Lizzeth Galaviz MD   17 g at 08/05/24 0942    senna-docusate (SENOKOT-S/PERICOLACE) 8.6-50 MG per tablet 1 tablet  1 tablet Oral BID PRN Lizzeth Galaviz MD        Or     senna-docusate (SENOKOT-S/PERICOLACE) 8.6-50 MG per tablet 2 tablet  2 tablet Oral BID PRN Lizzeth Galaviz MD        spironolactone (ALDACTONE) tablet 25 mg  25 mg Oral Daily Lizzeth Galaviz MD   25 mg at 08/05/24 0942          Social History   I have reviewed this patient's social history and updated it with pertinent information if needed.  Social History     Tobacco Use    Smoking status: Never   Substance Use Topics    Alcohol use: Not Currently      Patient lives with her daughter, apparently has been very active till recently including traveling.  Physical Exam   Vital Signs: Temp: 97.8  F (36.6  C) Temp src: Oral BP: (!) 149/52 Pulse: 74   Resp: 20 SpO2: 92 % O2 Device: None (Room air) Oxygen Delivery: 1.5 LPM  Weight: 136 lbs 14.49 oz    Rest of system regular rate and rhythm respiratory some chest good auscultation problem some distention, noted bruising over abdomen    Medical Decision Making       75  MINUTES SPENT BY ME on the date of service doing chart review, history, exam, documentation & further activities per the note.  MANAGEMENT DISCUSSED with the following over the past 24 hours:         Data

## 2024-08-06 NOTE — PROGRESS NOTES
[facility]: [facility]    Background: Transitional Care Management program identified per system criteria and reviewed by [facility] [place] team for possible outreach.    Assessment: Upon chart review, [facility] Team member will not proceed with patient outreach related to this episode of Transitional Care Management program due to reason below:    Patient has been discharged with Hospice Care    Plan: Transitional Care Management episode addressed appropriately per reason noted above.      RUBEN Martin  [facility] [place]    *Connected Care Resource Team does NOT follow patient ongoing. Referrals are identified based on internal discharge reports and the outreach is to ensure patient has an understanding of their discharge instructions.

## 2024-08-06 NOTE — DISCHARGE SUMMARY
Discharge Note    Patient discharged to home via private vehicle  accompanied by daughter.  Prescriptions filled and given to patient/family.   Belongings reviewed and sent with family.   Home medications returned to patient: NA  Equipment sent with: N/A.   family verbalizes understanding of discharge instructions. AVS given to family.